# Patient Record
Sex: FEMALE | Race: WHITE | NOT HISPANIC OR LATINO | Employment: FULL TIME | URBAN - METROPOLITAN AREA
[De-identification: names, ages, dates, MRNs, and addresses within clinical notes are randomized per-mention and may not be internally consistent; named-entity substitution may affect disease eponyms.]

---

## 2017-01-18 ENCOUNTER — TELEPHONE (OUTPATIENT)
Dept: BONE DENSITY | Facility: CLINIC | Age: 62
End: 2017-01-18

## 2017-01-18 ENCOUNTER — TELEPHONE (OUTPATIENT)
Dept: FAMILY MEDICINE | Facility: CLINIC | Age: 62
End: 2017-01-18

## 2017-01-18 DIAGNOSIS — Z13.820 SCREENING FOR OSTEOPOROSIS: Primary | ICD-10-CM

## 2017-01-18 NOTE — TELEPHONE ENCOUNTER
Patient requesting order thru [facility] appointment:  tried to schedule a Dexascan, however, they told me I will need an order sent from my  for that.       Could I request that thru Dr. Kinsey?  Thanks.           Kyung Jones

## 2017-02-07 ENCOUNTER — HOSPITAL ENCOUNTER (OUTPATIENT)
Dept: MAMMOGRAPHY | Facility: CLINIC | Age: 62
Discharge: HOME | End: 2017-02-07
Attending: FAMILY MEDICINE | Admitting: FAMILY MEDICINE
Payer: COMMERCIAL

## 2017-02-07 DIAGNOSIS — Z12.31 ENCOUNTER FOR SCREENING MAMMOGRAM FOR HIGH-RISK PATIENT: ICD-10-CM

## 2017-02-07 PROCEDURE — G0202 SCR MAMMO BI INCL CAD: HCPCS

## 2017-02-20 ENCOUNTER — RADIANT APPOINTMENT (OUTPATIENT)
Dept: BONE DENSITY | Facility: CLINIC | Age: 62
End: 2017-02-20
Attending: FAMILY MEDICINE
Payer: COMMERCIAL

## 2017-02-20 ENCOUNTER — OFFICE VISIT (OUTPATIENT)
Dept: OBGYN | Facility: CLINIC | Age: 62
End: 2017-02-20
Payer: COMMERCIAL

## 2017-02-20 VITALS
SYSTOLIC BLOOD PRESSURE: 130 MMHG | WEIGHT: 177.4 LBS | HEIGHT: 68 IN | DIASTOLIC BLOOD PRESSURE: 88 MMHG | BODY MASS INDEX: 26.89 KG/M2 | TEMPERATURE: 98.3 F

## 2017-02-20 DIAGNOSIS — Z01.411 ENCOUNTER FOR GYNECOLOGICAL EXAMINATION WITH ABNORMAL FINDING: ICD-10-CM

## 2017-02-20 DIAGNOSIS — Z13.820 SCREENING FOR OSTEOPOROSIS: ICD-10-CM

## 2017-02-20 DIAGNOSIS — C54.1 ENDOMETRIAL CANCER (H): Primary | ICD-10-CM

## 2017-02-20 DIAGNOSIS — B00.9 HSV (HERPES SIMPLEX VIRUS) INFECTION: ICD-10-CM

## 2017-02-20 DIAGNOSIS — R30.0 DYSURIA: ICD-10-CM

## 2017-02-20 LAB

## 2017-02-20 PROCEDURE — 87624 HPV HI-RISK TYP POOLED RSLT: CPT | Performed by: OBSTETRICS & GYNECOLOGY

## 2017-02-20 PROCEDURE — 99396 PREV VISIT EST AGE 40-64: CPT | Performed by: OBSTETRICS & GYNECOLOGY

## 2017-02-20 PROCEDURE — 77080 DXA BONE DENSITY AXIAL: CPT | Performed by: INTERNAL MEDICINE

## 2017-02-20 PROCEDURE — 81001 URINALYSIS AUTO W/SCOPE: CPT | Performed by: OBSTETRICS & GYNECOLOGY

## 2017-02-20 PROCEDURE — 88175 CYTOPATH C/V AUTO FLUID REDO: CPT | Performed by: OBSTETRICS & GYNECOLOGY

## 2017-02-20 RX ORDER — VALACYCLOVIR HYDROCHLORIDE 500 MG/1
TABLET, FILM COATED ORAL
Qty: 40 TABLET | Refills: 1 | Status: SHIPPED | OUTPATIENT
Start: 2017-02-20 | End: 2019-05-23

## 2017-02-20 NOTE — PROGRESS NOTES
HPI:  Mary Jane Matta is a 61 year old female , postmenopausal who presents for an annual exam and pap.  She is doing well.  Self breast exam,  ACS screening mammogram recs, the use of 81 mg ASA to decrease the risk of heart disease, lipid screening, colon cancer screening recs and Dexa scan recs thoroughly reveiwed.    Patient relates that she was fit with pessary in 2016, but had to cease usage due to discomfort. She relates her urinary symptoms are intermittent. Over the last 3 months, she reports that she feels her bladder has been hanging again. She experiences a burning sensation and wakes during the night due to back pain. She reports her bladder control to be okay. Patient denies pain with sexual intercourse.     Past Medical History   Diagnosis Date     Endometrial cancer (H)      Urinary incontinence      Past Surgical History   Procedure Laterality Date     Tonsillectomy       Surgical history of -        3       Hc tooth extraction w/forcep       C total abdom hysterectomy       Dr Len BARDALES BSO staging for Stage 1 B endometrial Ca     Cholecystectomy  2015     Family History   Problem Relation Age of Onset     Circulatory Father       at an early age of annurysm-brain- at 45     Alcohol/Drug Father      HEART DISEASE Maternal Grandmother      UNKNWON DETAILS     DIABETES Paternal Grandmother      Hypertension Paternal Grandmother      CEREBROVASCULAR DISEASE Paternal Grandmother      Family History Negative Mother      alive 81 yo old     OSTEOPOROSIS Mother      Family History Negative Brother      alive 58 , smoker     Alcohol/Drug Brother      one bother only      HEART DISEASE Brother      Family History Negative Maternal Grandfather       in accident     Family History Negative Paternal Grandfather      Breast Cancer No family hx of      Cancer - colorectal No family hx of      Social History     Social History     Marital status:      Spouse name:  N/A     Number of children: N/A     Years of education: N/A     Occupational History      w [employer] w [employer]     Social History Main Topics     Smoking status: Never Smoker     Smokeless tobacco: Never Used     Alcohol use Yes      Comment: socially      Drug use: No     Sexual activity: Yes     Partners: Male     Birth control/ protection: Surgical      Comment:  with vasectomy/hysterectomy     Other Topics Concern      Service No     Blood Transfusions No     Caffeine Concern Yes     2 cups per day     Occupational Exposure Yes     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 No     Social History Narrative     for 30 years, work fulltime , working as -[employer] , 31 ,27 adn 23 yo-all out of home, no pets       Allergies:  Review of patient's allergies indicates no known allergies.    Current Outpatient Prescriptions   Medication Sig Dispense Refill     azithromycin (ZITHROMAX) 250 MG tablet Two tablets first day, then one tablet daily for four days. 6 tablet 0     valACYclovir (VALTREX) 500 MG tablet Take  by mouth. 2 gm twice a day with onset of cold sore symptoms, for one day -atleast 12 hours apart 40 tablet 0     omeprazole (PRILOSEC) 40 MG capsule Take 1 capsule by mouth daily. Take 30-60 minutes before a meal. 30 capsule 3     Multiple Vitamin (DAILY MULTIVITAMIN PO) Take  by mouth daily.         ROS:  C: NEGATIVE for fever, chills, change in weight  I: NEGATIVE for worrisome rashes, moles or lesions. Derm mole check recommended  E: NEGATIVE for vision changes or irritation  E/M: NEGATIVE for ear, mouth and throat problems  R:  NEGATIVE for significant cough or SOB  B: NEGATIVE for masses, tenderness or discharge  CV: NEGATIVE for chest pain, palpitations or peripheral edema  GI: NEGATIVE for nausea, abdominal pain, heartburn, or change in bowel habits   female: Postmenopausal, burning vaginal sensation   : NEGATIVE for frequency, dysuria, or hematuria  M: NEGATIVE for significant arthralgias or myalgia  N: NEGATIVE for weakness, dizziness or paresthesias  E: NEGATIVE for temperature intolerance, skin/hair changes  H: NEGATIVE for bleeding problems  P: NEGATIVE for changes in mood or affect    This document serves as a record of the services and decisions personally performed and made by Keagan Bautista M.D. It was created on his behalf by Ethel Gaston, a trained medical scribe. The creation of this document is based on the provider's statements to the medical scribe.  Ethel Gaston 2:48 PM February 20, 2017    EXAM:  Vitals: LMP 08/07/2008  BMI= There is no height or weight on file to calculate BMI.  Constitutional: healthy, alert and no distress  Head: Normocephalic. No masses, lesions, tenderness or abnormalities  Neck: Neck supple. No adenopathy. Thyroid symmetric, normal size,, Carotids without bruits.  ENT: NEGATIVE for ear, mouth and throat problems  Breast:  breasts symmetric, no dominant or suspicious mass, no skin or nipple changes, no axillary adenopathy, unchanged from previous exam or self exam in taught and encouraged  Cardiovascular: negative, PMI normal. No lifts, heaves, or thrills. RRR. No murmurs, clicks gallops or rub  Respiratory: negative, Percussion normal. Good diaphragmatic excursion. Lungs clear  Gastrointestinal: Abdomen soft, non-tender. BS normal. No masses, organomegaly  Genitourinary: Pelvic Exam:  External Genitalia:     Normal appearance for age, no discharge present, no tenderness present, no inflammatory lesions present, color normal  Vagina:     Normal vaginal vault without central or  paravaginal defects, no discharge present, no inflammatory lesions present, no masses present  Bladder:     Nontender to palpation  Urethra:   Urethral Body:  Urethra palpation normal, urethra structural support normal   Urethral Meatus:  No erythema or lesions present  Cervix:     Absent  Uterus:     Absent  Adnexa:     No adnexal tenderness present, no adnexal masses present  Perineum:     Perineum within normal limits, no evidence of trauma, no rashes or skin lesions present  Anus:     Anus within normal limits, no hemorrhoids present  Inguinal Lymph Nodes:     No lymphadenopathy present  Pubic Hair:     Normal pubic hair distribution for age  Genitalia and Groin:     No rashes present, no lesions present, no areas of discoloration, no masses present  Musculoskeletalextremities normal- no gross deformities noted, gait normal and normal muscle tone  Integument: no suspicious lesions or rashes  Neurologic: Gait normal. Reflexes normal and symmetric. Sensation grossly WNL., negative findings: cranial nerves 2-12 intact, muscle strength normal, reflexes normal and symmetric  Psychiatric: mentation appears normal and affect normal/bright  Hematologic/Lymphatic/Immunologic: Normal cervical lymph nodes     ASSESSMENT:/PLAN:  (Z01.419) Encounter for gynecological examination without abnormal finding  (primary encounter diagnosis)  Comment: cystocele noted  Cares and rx options discussed   Plan: *UA reflex to Microscopic and Culture         ([facility] and [facility] (except         [place] and [place]), Pap imaged thin layer        diagnostic with HPV (select HPV order below),         HPV High Risk Types DNA Cervical        done    (C54.1) Endometrial cancer (H)  Comment: pap done as f/u of endometrial Ca Rx  No evidence of reoccurence  Plan: Pap imaged thin layer diagnostic with HPV         (select HPV order below), HPV High Risk Types         DNA Cervical        doen    (R30.0) Dysuria  Comment: we will  check UA  Plan: *UA reflex to Microscopic and Culture         ([facility] and [facility] (except         [place] and [place])        ordered    (B00.9) HSV (herpes simplex virus) infection  Comment: Patient requested a refill  Plan: valACYclovir (VALTREX) 500 MG tablet        Request placed    The information in this document, created by a scribe for me, accurately reflects the services I personally performed and the decisions made by me. I have reviewed and approved this document for accuracy.     Keagan Bautista M.D.

## 2017-02-20 NOTE — MR AVS SNAPSHOT
After Visit Summary   2/20/2017    Mary Jane Matta    MRN: [id number]           Patient Information     Date Of Birth          1955        Visit Information        Provider Department      2/20/2017 1:45 PM Keagan Bautista MD [facility]        Today's Diagnoses     Endometrial cancer (H)    -  1    Dysuria        HSV (herpes simplex virus) infection        Encounter for gynecological examination with abnormal finding          Care Instructions    You can reach your [facility] Care Team any time of the day by calling [phone]. This number will put you in touch with the 24 hour nurse line if the clinic is closed.    To contact your OB/GYN Station Coordinator/Surgery Scheduler please call [phone]. This is a direct number for your care team between 8 a.m. and 4 p.m. Monday through Friday.    [facility] is open for your convenience:  Monday through Friday 8 a.m. to 6 p.m.  Closed weekends and all major holidays.          Follow-ups after your visit        Who to contact     If you have questions or need follow up information about today's clinic visit or your schedule please contact [facility] directly at [phone].  Normal or non-critical lab and imaging results will be communicated to you by MyChart, letter or phone within 4 business days after the clinic has received the results. If you do not hear from us within 7 days, please contact the clinic through [employer] or phone. If you have a critical or abnormal lab result, we will notify you by phone as soon as possible.  Submit refill requests through [employer] or call your pharmacy and they will forward the refill request to us. Please allow 3 business days for your refill to be completed.          Additional Information About Your Visit        MyChart Information     [employer] gives you secure access to your electronic health record. If you see a primary care provider, you can also send messages to your  "care team and make appointments. If you have questions, please call your primary care clinic.  If you do not have a primary care provider, please call [phone] and they will assist you.        Care EveryWhere ID     This is your Care EveryWhere ID. This could be used by other organizations to access your [facility] medical records  [id number]        Your Vitals Were     Temperature Height Last Period BMI (Body Mass Index)          98.3  F (36.8  C) (Oral) 5' 8\" (1.727 m) 08/07/2008 26.97 kg/m2         Blood Pressure from Last 3 Encounters:   02/20/17 130/88   12/07/16 126/84   02/10/16 110/78    Weight from Last 3 Encounters:   02/20/17 177 lb 6.4 oz (80.5 kg)   12/07/16 181 lb 6.4 oz (82.3 kg)   02/10/16 176 lb 4.8 oz (80 kg)              We Performed the Following     *UA reflex to Microscopic and Culture ([facility] and [facility] (except [place] and [place])     HPV High Risk Types DNA Cervical     Pap imaged thin layer diagnostic with HPV (select HPV order below)     Urine Microscopic          Where to get your medicines      These medications were sent to [facility] [address] [address] AT [facility] [address] [address]    Hours:  24-hours Phone:  [phone]     valACYclovir 500 MG tablet          Primary Care Provider Office Phone # Fax #    Laura Ford [phone]       [facility] [address]  [facility] [address]        Thank you!     Thank you for choosing [facility]  for your care. Our goal is always to provide you with excellent care. Hearing back from our patients is one way we can continue to improve our services. Please take a few minutes to complete the written survey that you may receive in the mail after your visit with us. Thank you!             Your Updated Medication List - Protect others around you: Learn how to safely use, store and " throw away your medicines at www.disposemymeds.org.          This list is accurate as of: 2/20/17 11:59 PM.  Always use your most recent med list.                   Brand Name Dispense Instructions for use    DAILY MULTIVITAMIN PO      Take by mouth daily Reported on 2/20/2017       omeprazole 40 MG capsule    priLOSEC    30 capsule    Take 1 capsule by mouth daily. Take 30-60 minutes before a meal.       valACYclovir 500 MG tablet    VALTREX    40 tablet    Take  by mouth. 2 gm twice a day with onset of cold sore symptoms, for one day -atleast 12 hours apart

## 2017-02-20 NOTE — PATIENT INSTRUCTIONS
You can reach your [facility] Care Team any time of the day by calling [phone]. This number will put you in touch with the 24 hour nurse line if the clinic is closed.    To contact your OB/GYN Station Coordinator/Surgery Scheduler please call [phone]. This is a direct number for your care team between 8 a.m. and 4 p.m. Monday through Friday.    [facility] is open for your convenience:  Monday through Friday 8 a.m. to 6 p.m.  Closed weekends and all major holidays.

## 2017-02-20 NOTE — NURSING NOTE
"Chief Complaint   Patient presents with     Gyn Exam     pap due--had mammo 2/7/17--sx of UTI--prolapse       Initial /88  Temp 98.3  F (36.8  C) (Oral)  Ht 5' 8\" (1.727 m)  Wt 177 lb 6.4 oz (80.5 kg)  LMP 08/07/2008  BMI 26.97 kg/m2 Estimated body mass index is 26.97 kg/(m^2) as calculated from the following:    Height as of this encounter: 5' 8\" (1.727 m).    Weight as of this encounter: 177 lb 6.4 oz (80.5 kg).  Medication Reconciliation: complete   Trixie Samuel CMA      "

## 2017-02-22 LAB
COPATH REPORT: NORMAL
PAP: NORMAL

## 2017-02-24 LAB
FINAL DIAGNOSIS: NORMAL
HPV HR 12 DNA CVX QL NAA+PROBE: NEGATIVE
HPV16 DNA SPEC QL NAA+PROBE: NEGATIVE
HPV18 DNA SPEC QL NAA+PROBE: NEGATIVE
SPECIMEN DESCRIPTION: NORMAL

## 2017-05-24 ENCOUNTER — RADIANT APPOINTMENT (OUTPATIENT)
Dept: GENERAL RADIOLOGY | Facility: CLINIC | Age: 62
End: 2017-05-24
Attending: PODIATRIST
Payer: COMMERCIAL

## 2017-05-24 ENCOUNTER — OFFICE VISIT (OUTPATIENT)
Dept: PODIATRY | Facility: CLINIC | Age: 62
End: 2017-05-24
Payer: COMMERCIAL

## 2017-05-24 VITALS — BODY MASS INDEX: 26.83 KG/M2 | HEART RATE: 88 BPM | WEIGHT: 177 LBS | HEIGHT: 68 IN | RESPIRATION RATE: 16 BRPM

## 2017-05-24 DIAGNOSIS — L60.0 INGROWING NAIL: ICD-10-CM

## 2017-05-24 DIAGNOSIS — M79.674 PAIN IN TOE OF RIGHT FOOT: Primary | ICD-10-CM

## 2017-05-24 DIAGNOSIS — R20.0 NUMBNESS OF TOES: ICD-10-CM

## 2017-05-24 PROCEDURE — 73630 X-RAY EXAM OF FOOT: CPT | Mod: RT

## 2017-05-24 PROCEDURE — 99203 OFFICE O/P NEW LOW 30 MIN: CPT | Performed by: PODIATRIST

## 2017-05-24 NOTE — NURSING NOTE
"Chief Complaint   Patient presents with     Foot Problems     Right 2nd toe injury x 1 year, dropped something on it, numbness, nail is posbbily ingrown       Initial Pulse 88  Resp 16  Ht 5' 8\" (1.727 m)  Wt 177 lb (80.3 kg)  LMP 08/07/2008  BMI 26.91 kg/m2 Estimated body mass index is 26.91 kg/(m^2) as calculated from the following:    Height as of this encounter: 5' 8\" (1.727 m).    Weight as of this encounter: 177 lb (80.3 kg).  Medication Reconciliation: complete  "

## 2017-05-24 NOTE — PATIENT INSTRUCTIONS
DR. SALINAS'S SCHEDULE:        MONDAY / FRIDAY AM - [place] WED[place] - [place]   [address] [place] [address] [place] [address]   P: [phone] P: [phone]   F: [phone] F:[phone]       TUESDAY - SURGERY THURSDAY - [place]   [phone] [address] [place] [address]   TO SCHEDULE AN APPT CALL: P: [phone] F: [phone]     FYI: Our schedule at [place] on Wednesday is from 7 AM - 2 PM.    SHOE RECOMMENDATIONS  Athletic Shoes Dress Shoes Sandals Inserts Stores   - United By Blue  - Asics  - Saucony (Kids)  - Paz - Dansko  - Shaw Afb  - Lulu  - Keen - Vionic  - Birkenstocks  - Teva  - Ecco  - Nash  - Halflinfer  - Crocs - SuperFeet  - Profoot  - Spenco  - Sofsole  - Tacco  - Powerstep  - WalkFit - Ángel Shoes  - The Walking Co.   - TC Running Co.   - Michael's  - DSW  - Online         TOE SPACERS        BODY MASS INDEX (BMI)  Many things can cause foot and ankle problems. Foot structure, activity level, foot mechanics and injuries are common causes of pain.    One very important issue that often goes unmentioned, is body weight.  Extra weight can cause increased stress on muscles, ligaments, bones and tendons. Sometimes just a few extra pounds is all it takes to put one over her/his threshold. Without reducing that stress, it can be difficult to alleviate pain.      Some people are uncomfortable addressing this issue, but we feel it is important for you to think about it. As Foot & Ankle specialists, our job is addressing the lower extremity problem and possible causes.     Regarding extra body weight, we encourage patients to discuss diet and weight management plans with their primary care doctors. It is this team approach that gives you the best opportunity for pain relief and getting you back on your feet.

## 2017-05-24 NOTE — LETTER
"  2017       RE: Mary Jane Matta  [address]  [place] [address]           Dear Colleague,    Thank you for referring your patient, Mary Jane Matta, to the [facility]. Please see a copy of my visit note below.      ASSESSMENT/PLAN:    Encounter Diagnoses   Name Primary?     Pain in toe of right foot Yes     Ingrowing nail      I am not sure what cause her, now resolved, dorsal right foot pain.  Differential includes:  \"lace-bite,\"  Tendonitis, stress reaction of bone, ligament strain    I suggested that she wear more supportive, rigid soled shoes mores of the time.  She showed me what she typically wears - high heel and flexible.     Her right 2nd toe pain appears to be related to the medial skin fold being compressed between nail and hallux.  We discussed options of toe spacer and partial nail avulsion.    Her numbness is likely from nerve compression under the right 2nd metatarsophalangeal joint and possibly related to shoe choice.  I am not concerned. It is localized and unilateral.     Body mass index is 26.91 kg/(m^2).    Weight management plan: Patient was referred to their PCP to discuss a diet and exercise plan.      Gordon Carranza DPM, FACFAS, MS    [facility] Department of Podiatry/Foot & Ankle Surgery      ____________________________________________________________________    HPI:         Chief Complaint: pain on top of the right foot. This is why she made the appointment.  It started during exercise.  It has resolved.      Her other issue is right 2nd toe pain and numbness.  Numbness is more on the bottom and pain distal lateral skin fold.   Onset of problem: 6+ months; she dropped a ceramic coaster on her toe.  It was painful and there was bruising.   Toe pain/ discomfort is described as:  Stinging pain, shooting  Ratin/10   Frequency:  intermittent    Certain shoes cause more pain  Previous treatment: no    *  Past Medical History:   Diagnosis Date     " Endometrial cancer (H)      Urinary incontinence    *  *  Past Surgical History:   Procedure Laterality Date     C TOTAL ABDOM HYSTERECTOMY      Dr Len BARDALES BSO staging for Stage 1 B endometrial Ca     CHOLECYSTECTOMY  2015     HC TOOTH EXTRACTION W/FORCEP       SURGICAL HISTORY OF -       3       TONSILLECTOMY     *  *  Current Outpatient Prescriptions   Medication Sig Dispense Refill     valACYclovir (VALTREX) 500 MG tablet Take  by mouth. 2 gm twice a day with onset of cold sore symptoms, for one day -atleast 12 hours apart 40 tablet 1     omeprazole (PRILOSEC) 40 MG capsule Take 1 capsule by mouth daily. Take 30-60 minutes before a meal. 30 capsule 3     Multiple Vitamin (DAILY MULTIVITAMIN PO) Take by mouth daily Reported on 2017         ROS:     A 10-point review of systems was performed and is positive for that noted in the HPI and as seen below.  All other areas are negative.     Numbness in feet?  yes   Calf pain with walking? no  Recent foot/ankle injury? Dropped ceramic coaster on  Right 2nd toe 6-9 months ago  Weight change over past 12 months? no  Self perception as overweight? yes  Recent flu-like symptoms? no  Joint pain other than feet ? no    Social History: Employment:   at [employer];  Exercise/Physical activity:  Group Fitness at [employer] 2-3x/ week;  Tobacco use:  no  Social History     Social History     Marital status:      Spouse name: N/A     Number of children: N/A     Years of education: N/A     Occupational History      w [employer] w seniors [employer]     Social History Main Topics     Smoking status: Never Smoker     Smokeless tobacco: Never Used     Alcohol use Yes      Comment: socially      Drug use: No     Sexual activity: Yes     Partners: Male     Birth control/ protection: Surgical      Comment:  with vasectomy/hysterectomy     Other Topics Concern      Service No     Blood Transfusions  "No     Caffeine Concern Yes     2 cups per day     Occupational Exposure Yes     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 No     Social History Narrative     for 30 years, work fulltime , working as -[employer] , 31 ,27 adn 25 yo-all out of home, no pets       Family history:  Family History   Problem Relation Age of Onset     Circulatory Father       at an early age of annurysm-brain- at 45     Alcohol/Drug Father      HEART DISEASE Maternal Grandmother      UNKNWON DETAILS     DIABETES Paternal Grandmother      Hypertension Paternal Grandmother      CEREBROVASCULAR DISEASE Paternal Grandmother      Family History Negative Mother      alive 81 yo old     OSTEOPOROSIS Mother      Family History Negative Maternal Grandfather       in accident     Family History Negative Paternal Grandfather      Family History Negative Brother      alive 58 , smoker     Alcohol/Drug Brother      one bother only      HEART DISEASE Brother      Breast Cancer No family hx of      Cancer - colorectal No family hx of        Rheumatoid arthritis:  no  Foot Problems: no  Diabetes: no      EXAM:    Vitals: Pulse 88  Resp 16  Ht 5' 8\" (1.727 m)  Wt 177 lb (80.3 kg)  LMP 2008  BMI 26.91 kg/m2  BMI: Body mass index is 26.91 kg/(m^2).  Height: 5' 8\"    Constitutional/ general:  Pt is in no apparent distress, appears well-nourished.  Cooperative with history and physical exam.     Vascular:  Pedal pulses are palpable bilaterally for both the DP and PT arteries.  CFT < 3 sec.  No edema.  Pedal hair growth noted.     Neuro:  Alert and oriented x 3. Coordinated gait.  Light touch sensation is intact to the " L4, L5, S1 distributions. No obvious deficits.  No evidence of neurological-based weakness, spasticity, or contracture in the lower extremities.     Derm: Normal texture and turgor.  No erythema, ecchymosis, or cyanosis.  No open lesions.  The right 2nd toenail is mildly incurvated. Tenderness on compression over the medial skin fold.  No associated erythema.     Musculoskeletal:    Lower extremity muscle strength is normal.  Patient is ambulatory without an assistive device or brace .  No gross deformities. With loading of her forefoot, her lesser toes deviate medially and the medial skin fold of the 2nd toe is pinched between the nail and hallux.     Radiographic Exam:  X-Ray Findings:  I personally reviewed the films. No fracture seen in the 2nd toe.  No evidence of metatarsal shaft fracture or periosteal reaction.      Gordon Carranza DPM, FACAQUILINO, MS    [place] Department of Podiatry/Foot & Ankle Surgery                Again, thank you for allowing me to participate in the care of your patient.        Sincerely,              Gordon Carranza DPM

## 2017-05-24 NOTE — PROGRESS NOTES
"  ASSESSMENT/PLAN:    Encounter Diagnoses   Name Primary?     Pain in toe of right foot Yes     Ingrowing nail      I am not sure what cause her, now resolved, dorsal right foot pain.  Differential includes:  \"lace-bite,\"  Tendonitis, stress reaction of bone, ligament strain    I suggested that she wear more supportive, rigid soled shoes mores of the time.  She showed me what she typically wears - high heel and flexible.     Her right 2nd toe pain appears to be related to the medial skin fold being compressed between nail and hallux.  We discussed options of toe spacer and partial nail avulsion.    Her numbness is likely from nerve compression under the right 2nd metatarsophalangeal joint and possibly related to shoe choice.  I am not concerned. It is localized and unilateral.     Body mass index is 26.91 kg/(m^2).    Weight management plan: Patient was referred to their PCP to discuss a diet and exercise plan.      Gordon Carranza DPM, FACFAS, [facility] Department of Podiatry/Foot & Ankle Surgery      ____________________________________________________________________    HPI:         Chief Complaint: pain on top of the right foot. This is why she made the appointment.  It started during exercise.  It has resolved.      Her other issue is right 2nd toe pain and numbness.  Numbness is more on the bottom and pain distal lateral skin fold.   Onset of problem: 6+ months; she dropped a ceramic coaster on her toe.  It was painful and there was bruising.   Toe pain/ discomfort is described as:  Stinging pain, shooting  Ratin/10   Frequency:  intermittent    Certain shoes cause more pain  Previous treatment: no    *  Past Medical History:   Diagnosis Date     Endometrial cancer (H)      Urinary incontinence    *  *  Past Surgical History:   Procedure Laterality Date     C TOTAL ABDOM HYSTERECTOMY      Dr Len BARDALES BSO staging for Stage 1 B endometrial Ca     CHOLECYSTECTOMY  2015     HC TOOTH EXTRACTION " W/FORCEP       SURGICAL HISTORY OF -       3       TONSILLECTOMY     *  *  Current Outpatient Prescriptions   Medication Sig Dispense Refill     valACYclovir (VALTREX) 500 MG tablet Take  by mouth. 2 gm twice a day with onset of cold sore symptoms, for one day -atleast 12 hours apart 40 tablet 1     omeprazole (PRILOSEC) 40 MG capsule Take 1 capsule by mouth daily. Take 30-60 minutes before a meal. 30 capsule 3     Multiple Vitamin (DAILY MULTIVITAMIN PO) Take by mouth daily Reported on 2017         ROS:     A 10-point review of systems was performed and is positive for that noted in the HPI and as seen below.  All other areas are negative.     Numbness in feet?  yes   Calf pain with walking? no  Recent foot/ankle injury? Dropped ceramic coaster on  Right 2nd toe 6-9 months ago  Weight change over past 12 months? no  Self perception as overweight? yes  Recent flu-like symptoms? no  Joint pain other than feet ? no    Social History: Employment:   at [employer];  Exercise/Physical activity:  Group Fitness at [employer] 2-3x/ week;  Tobacco use:  no  Social History     Social History     Marital status:      Spouse name: N/A     Number of children: N/A     Years of education: N/A     Occupational History      w [employer] w [employer]     Social History Main Topics     Smoking status: Never Smoker     Smokeless tobacco: Never Used     Alcohol use Yes      Comment: socially      Drug use: No     Sexual activity: Yes     Partners: Male     Birth control/ protection: Surgical      Comment:  with vasectomy/hysterectomy     Other Topics Concern      Service No     Blood Transfusions No     Caffeine Concern Yes     2 cups per day     Occupational Exposure Yes     medical social worker for [employer]     Hobby Snapeee No     Sleep Concern No     Stress Concern No     Weight Concern Yes     gained 25 lbs in one year     Special Diet No  "    Back Care No     Exercise Yes     runs on Sentis[employer]     Bike Helmet No     Seat Belt Yes     Self-Exams No     Parent/Sibling W/ Cabg, Mi Or Angioplasty Before 65f 55m? No     Social History Narrative     for 30 years, work fulltime , working as -[employer] , 31 ,27 adn 23 yo-all out of home, no pets       Family history:  Family History   Problem Relation Age of Onset     Circulatory Father       at an early age of annurysm-brain- at 45     Alcohol/Drug Father      HEART DISEASE Maternal Grandmother      UNKNWON DETAILS     DIABETES Paternal Grandmother      Hypertension Paternal Grandmother      CEREBROVASCULAR DISEASE Paternal Grandmother      Family History Negative Mother      alive 79 yo old     OSTEOPOROSIS Mother      Family History Negative Maternal Grandfather       in accident     Family History Negative Paternal Grandfather      Family History Negative Brother      alive 58 , smoker     Alcohol/Drug Brother      one bother only      HEART DISEASE Brother      Breast Cancer No family hx of      Cancer - colorectal No family hx of        Rheumatoid arthritis:  no  Foot Problems: no  Diabetes: no      EXAM:    Vitals: Pulse 88  Resp 16  Ht 5' 8\" (1.727 m)  Wt 177 lb (80.3 kg)  LMP 2008  BMI 26.91 kg/m2  BMI: Body mass index is 26.91 kg/(m^2).  Height: 5' 8\"    Constitutional/ general:  Pt is in no apparent distress, appears well-nourished.  Cooperative with history and physical exam.     Vascular:  Pedal pulses are palpable bilaterally for both the DP and PT arteries.  CFT < 3 sec.  No edema.  Pedal hair growth noted.     Neuro:  Alert and oriented x 3. Coordinated gait.  Light touch sensation is intact to the L4, L5, S1 distributions. No obvious deficits.  No evidence of neurological-based weakness, spasticity, or contracture in the lower extremities.     Derm: Normal texture and turgor.  No erythema, ecchymosis, or cyanosis.  No open lesions.  The right 2nd " toenail is mildly incurvated. Tenderness on compression over the medial skin fold.  No associated erythema.     Musculoskeletal:    Lower extremity muscle strength is normal.  Patient is ambulatory without an assistive device or brace .  No gross deformities. With loading of her forefoot, her lesser toes deviate medially and the medial skin fold of the 2nd toe is pinched between the nail and hallux.     Radiographic Exam:  X-Ray Findings:  I personally reviewed the films. No fracture seen in the 2nd toe.  No evidence of metatarsal shaft fracture or periosteal reaction.      Gordon Carranza DPM, FACFAS, MS    [place] Department of Podiatry/Foot & Ankle Surgery

## 2017-05-24 NOTE — MR AVS SNAPSHOT
After Visit Summary   2017    Mary Jane Matta    MRN: [id number]           Patient Information     Date Of Birth          1955        Visit Information        Provider Department      2017 9:30 AM Gordon Salinas DPM [facility]        Care Instructions    DR. SALINAS'S SCHEDULE:        MONDAY / FRIDAY AM - [place] WEDNESDAY - [place]   [address] [place] [address] [place] [address]   P: [phone] P: [phone]   F: [phone] F:[phone]       TUESDAY - SURGERY THURSDAY - [place]   [phone] [address] [place] [address]   TO SCHEDULE AN APPT CALL: P: [phone] F: [phone]     FYI: Our schedule at [place] on Wednesday is from 7 AM - 2 PM.    SHOE RECOMMENDATIONS  Athletic Shoes Dress Shoes Sandals Inserts Stores   - New Balance  - Asics  - Saucony (Kids)  - Paz - Dansko  - [place]  - Lulu  - Keen - Vionic  - Birkenstocks  - Teva  - Ecco  - Nash  - Halflinfer  - Crocs - SuperFeet  - Profoot  - Spenco  - Sofsole  - Tacco  - Powerstep  - WalkFit - Ángel Shoes  - The Walking Co.   - TC Running Co.   - Michael's  - DSW  - Online         TOE SPACERS        BODY MASS INDEX (BMI)  Many things can cause foot and ankle problems. Foot structure, activity level, foot mechanics and injuries are common causes of pain.    One very important issue that often goes unmentioned, is body weight.  Extra weight can cause increased stress on muscles, ligaments, bones and tendons. Sometimes just a few extra pounds is all it takes to put one over her/his threshold. Without reducing that stress, it can be difficult to alleviate pain.      Some people are uncomfortable addressing this issue, but we feel it is important for you to think about it. As Foot & Ankle specialists, our job is addressing the lower extremity problem and possible causes.     Regarding extra body weight, we encourage patients to  "discuss diet and weight management plans with their primary care doctors. It is this team approach that gives you the best opportunity for pain relief and getting you back on your feet.                Follow-ups after your visit        Who to contact     If you have questions or need follow up information about today's clinic visit or your schedule please contact [facility] directly at [phone].  Normal or non-critical lab and imaging results will be communicated to you by MyChart, letter or phone within 4 business days after the clinic has received the results. If you do not hear from us within 7 days, please contact the clinic through Hello Musict or phone. If you have a critical or abnormal lab result, we will notify you by phone as soon as possible.  Submit refill requests through [facility] or call your pharmacy and they will forward the refill request to us. Please allow 3 business days for your refill to be completed.          Additional Information About Your Visit        TweetflowharShopReply Information     [facility] gives you secure access to your electronic health record. If you see a primary care provider, you can also send messages to your care team and make appointments. If you have questions, please call your primary care clinic.  If you do not have a primary care provider, please call [phone] and they will assist you.        Care EveryWhere ID     This is your Care EveryWhere ID. This could be used by other organizations to access your [facility] medical records  [id number]        Your Vitals Were     Pulse Respirations Height Last Period BMI (Body Mass Index)       88 16 5' 8\" (1.727 m) 08/07/2008 26.91 kg/m2        Blood Pressure from Last 3 Encounters:   02/20/17 130/88   12/07/16 126/84   02/10/16 110/78    Weight from Last 3 Encounters:   05/24/17 177 lb (80.3 kg)   02/20/17 177 lb 6.4 oz (80.5 kg)   12/07/16 181 lb 6.4 oz (82.3 kg)              Today, you had the following     No orders found for " display       Primary Care Provider Office Phone # Fax #    Laura Obi Ford [phone]       [facility] [address]  [facility] [address]        Thank you!     Thank you for choosing [facility]  for your care. Our goal is always to provide you with excellent care. Hearing back from our patients is one way we can continue to improve our services. Please take a few minutes to complete the written survey that you may receive in the mail after your visit with us. Thank you!             Your Updated Medication List - Protect others around you: Learn how to safely use, store and throw away your medicines at www.disposemymeds.org.          This list is accurate as of: 5/24/17  9:48 AM.  Always use your most recent med list.                   Brand Name Dispense Instructions for use    DAILY MULTIVITAMIN PO      Take by mouth daily Reported on 2/20/2017       omeprazole 40 MG capsule    priLOSEC    30 capsule    Take 1 capsule by mouth daily. Take 30-60 minutes before a meal.       valACYclovir 500 MG tablet    VALTREX    40 tablet    Take  by mouth. 2 gm twice a day with onset of cold sore symptoms, for one day -atleast 12 hours apart

## 2018-04-23 ENCOUNTER — HEALTH MAINTENANCE LETTER (OUTPATIENT)
Age: 63
End: 2018-04-23

## 2018-07-18 ENCOUNTER — HOSPITAL ENCOUNTER (OUTPATIENT)
Dept: MAMMOGRAPHY | Facility: CLINIC | Age: 63
Discharge: HOME | End: 2018-07-18
Attending: FAMILY MEDICINE | Admitting: FAMILY MEDICINE
Payer: COMMERCIAL

## 2018-07-18 DIAGNOSIS — Z12.31 VISIT FOR SCREENING MAMMOGRAM: ICD-10-CM

## 2018-07-18 PROCEDURE — 77067 SCR MAMMO BI INCL CAD: CPT

## 2018-07-19 ENCOUNTER — OFFICE VISIT (OUTPATIENT)
Dept: FAMILY MEDICINE | Facility: CLINIC | Age: 63
End: 2018-07-19
Payer: COMMERCIAL

## 2018-07-19 VITALS
TEMPERATURE: 98.2 F | HEIGHT: 68 IN | DIASTOLIC BLOOD PRESSURE: 80 MMHG | BODY MASS INDEX: 27.43 KG/M2 | SYSTOLIC BLOOD PRESSURE: 128 MMHG | WEIGHT: 181 LBS | HEART RATE: 86 BPM

## 2018-07-19 DIAGNOSIS — Z23 ENCOUNTER FOR IMMUNIZATION: ICD-10-CM

## 2018-07-19 DIAGNOSIS — F43.0 ACUTE REACTION TO STRESS: Primary | ICD-10-CM

## 2018-07-19 LAB
ALBUMIN SERPL-MCNC: 3.9 G/DL (ref 3.4–5)
ALP SERPL-CCNC: 104 U/L (ref 40–150)
ALT SERPL W P-5'-P-CCNC: 27 U/L (ref 0–50)
ANION GAP SERPL CALCULATED.3IONS-SCNC: 5 MMOL/L (ref 3–14)
AST SERPL W P-5'-P-CCNC: 18 U/L (ref 0–45)
BILIRUB SERPL-MCNC: 0.5 MG/DL (ref 0.2–1.3)
BUN SERPL-MCNC: 18 MG/DL (ref 7–30)
CALCIUM SERPL-MCNC: 8.8 MG/DL (ref 8.5–10.1)
CHLORIDE SERPL-SCNC: 105 MMOL/L (ref 94–109)
CO2 SERPL-SCNC: 30 MMOL/L (ref 20–32)
CREAT SERPL-MCNC: 0.81 MG/DL (ref 0.52–1.04)
ERYTHROCYTE [DISTWIDTH] IN BLOOD BY AUTOMATED COUNT: 12.5 % (ref 10–15)
GFR SERPL CREATININE-BSD FRML MDRD: 71 ML/MIN/1.7M2
GLUCOSE SERPL-MCNC: 99 MG/DL (ref 70–99)
HCT VFR BLD AUTO: 43.7 % (ref 35–47)
HGB BLD-MCNC: 14.6 G/DL (ref 11.7–15.7)
MCH RBC QN AUTO: 31.5 PG (ref 26.5–33)
MCHC RBC AUTO-ENTMCNC: 33.4 G/DL (ref 31.5–36.5)
MCV RBC AUTO: 94 FL (ref 78–100)
PLATELET # BLD AUTO: 301 10E9/L (ref 150–450)
POTASSIUM SERPL-SCNC: 4.5 MMOL/L (ref 3.4–5.3)
PROT SERPL-MCNC: 7.7 G/DL (ref 6.8–8.8)
RBC # BLD AUTO: 4.63 10E12/L (ref 3.8–5.2)
SODIUM SERPL-SCNC: 140 MMOL/L (ref 133–144)
T4 FREE SERPL-MCNC: 0.97 NG/DL (ref 0.76–1.46)
TSH SERPL DL<=0.005 MIU/L-ACNC: 1.35 MU/L (ref 0.4–4)
WBC # BLD AUTO: 9.3 10E9/L (ref 4–11)

## 2018-07-19 PROCEDURE — 90715 TDAP VACCINE 7 YRS/> IM: CPT | Performed by: FAMILY MEDICINE

## 2018-07-19 PROCEDURE — 36415 COLL VENOUS BLD VENIPUNCTURE: CPT | Performed by: FAMILY MEDICINE

## 2018-07-19 PROCEDURE — 99214 OFFICE O/P EST MOD 30 MIN: CPT | Mod: 25 | Performed by: FAMILY MEDICINE

## 2018-07-19 PROCEDURE — 90750 HZV VACC RECOMBINANT IM: CPT | Performed by: FAMILY MEDICINE

## 2018-07-19 PROCEDURE — 84439 ASSAY OF FREE THYROXINE: CPT | Performed by: FAMILY MEDICINE

## 2018-07-19 PROCEDURE — 90471 IMMUNIZATION ADMIN: CPT | Performed by: FAMILY MEDICINE

## 2018-07-19 PROCEDURE — 90472 IMMUNIZATION ADMIN EACH ADD: CPT | Performed by: FAMILY MEDICINE

## 2018-07-19 PROCEDURE — 84443 ASSAY THYROID STIM HORMONE: CPT | Performed by: FAMILY MEDICINE

## 2018-07-19 PROCEDURE — 85027 COMPLETE CBC AUTOMATED: CPT | Performed by: FAMILY MEDICINE

## 2018-07-19 PROCEDURE — 80053 COMPREHEN METABOLIC PANEL: CPT | Performed by: FAMILY MEDICINE

## 2018-07-19 NOTE — PROGRESS NOTES
"  SUBJECTIVE:   Mary Jane Matta is a 63 year old female who presents to clinic today for the following health issues:      Having trouble with anxiety, weight gain, and hair loss.     Notes she has been caring for her mom, who broke her back, for the past 9 months. She is also working in a stressful, high demand setting.    She has stopped working out. Is not taking breaks at work, typically works through her day.     She has had success with paleo diet and WW diet, but than gains the weight back.     Not losing hair in patches, seems to be overall. New for her.     Surgical menopause 10 years ago with endometrial cancer.     Has intermittent episodes of unilateral facial numbness and tingling. Has previously been diagnosed as \"herpes\". Wonders about the shingles vaccine.       Problem list and histories reviewed & adjusted, as indicated.  Additional history: none    Patient Active Problem List   Diagnosis     Menorrhagia     Endometrial cancer (H)     CARDIOVASCULAR SCREENING; LDL GOAL LESS THAN 160     Blood in urine     Herpes labialis     Osteopenia     Advanced directives, counseling/discussion     GERD (gastroesophageal reflux disease)     Dysphagia     History of endometrial cancer     Pain in joint, lower leg     Past Surgical History:   Procedure Laterality Date     C TOTAL ABDOM HYSTERECTOMY      Dr Len BARDALES BSO staging for Stage 1 B endometrial Ca     CHOLECYSTECTOMY  2015     HC TOOTH EXTRACTION W/FORCEP       SURGICAL HISTORY OF -       3       TONSILLECTOMY         Social History   Substance Use Topics     Smoking status: Never Smoker     Smokeless tobacco: Never Used     Alcohol use Yes      Comment: socially      Family History   Problem Relation Age of Onset     Circulatory Father       at an early age of annurysm-brain- at 45     Alcohol/Drug Father      HEART DISEASE Maternal Grandmother      UNKNWON DETAILS     Diabetes Paternal Grandmother      Hypertension Paternal " "Grandmother      Cerebrovascular Disease Paternal Grandmother      Family History Negative Mother      alive 81 yo old     Osteoperosis Mother      Family History Negative Maternal Grandfather       in accident     Family History Negative Paternal Grandfather      Family History Negative Brother      alive 58 , smoker     Alcohol/Drug Brother      one bother only      HEART DISEASE Brother      Breast Cancer No family hx of      Cancer - colorectal No family hx of            Reviewed and updated as needed this visit by clinical staff       Reviewed and updated as needed this visit by Provider         ROS:  Constitutional, HEENT, cardiovascular, pulmonary, gi and gu systems are negative, except as otherwise noted.    OBJECTIVE:     /80 (BP Location: Right arm, Patient Position: Sitting, Cuff Size: Adult Regular)  Pulse 86  Temp 98.2  F (36.8  C) (Oral)  Ht 5' 8\" (1.727 m)  Wt 181 lb (82.1 kg)  LMP 2008  Breastfeeding? No  BMI 27.52 kg/m2  Body mass index is 27.52 kg/(m^2).  GENERAL: healthy, alert and no distress  EYES: Eyes grossly normal to inspection  RESP: lungs clear to auscultation - no rales, rhonchi or wheezes  CV: regular rate and rhythm, normal S1 S2, no S3 or S4, no murmur, click or rub, no peripheral edema and peripheral pulses strong  NEURO: Normal strength and tone, mentation intact and speech normal  PSYCH: mentation appears normal, affect normal/bright    Diagnostic Test Results:  none     ASSESSMENT/PLAN:     1. Acute reaction to stress - discussed her current symptoms are likely related to her stress, reviewed options for better self care. Will also check lab work today to ensure no organic cause.   - TSH  - T4, free  - CBC with platelets  - Comprehensive metabolic panel (BMP + Alb, Alk Phos, ALT, AST, Total. Bili, TP)    2. Encounter for immunization  - ADMIN 1st VACCINE  - TDAP, IM (10 - 64 YRS) - Adacel  - ZOSTER VACCINE RECOMBINANT ADJUVANTED IM NJX    25 minutes spent " with patient face-to-face, > 50% in counseling and coordination of care regarding the issues addressed in the assessment and plan.     Laura Ford MD  [facility]

## 2018-07-19 NOTE — MR AVS SNAPSHOT
After Visit Summary   7/19/2018    Mary Jane Matta    MRN: [id number]           Patient Information     Date Of Birth          1955        Visit Information        Provider Department      7/19/2018 9:40 AM Laura Ford MD [facility]        Today's Diagnoses     Acute reaction to stress    -  1    Encounter for immunization           Follow-ups after your visit        Follow-up notes from your care team     Return in about 1 year (around 7/19/2019) for Yearly Physical Exam.      Your next 10 appointments already scheduled     Jul 30, 2018 11:30 AM CDT   Office Visit with Alyson Yeboah MD   [facility] ([facility])    [address] [place] [id number] [phone]           Bring a current list of meds and any records pertaining to this visit. For Physicals, please bring immunization records and any forms needing to be filled out. Please arrive 10 minutes early to complete paperwork.              Who to contact     If you have questions or need follow up information about today's clinic visit or your schedule please contact [facility] directly at [phone].  Normal or non-critical lab and imaging results will be communicated to you by MyChart, letter or phone within 4 business days after the clinic has received the results. If you do not hear from us within 7 days, please contact the clinic through Gaos[employer]art or phone. If you have a critical or abnormal lab result, we will notify you by phone as soon as possible.  Submit refill requests through [employer] or call your pharmacy and they will forward the refill request to us. Please allow 3 business days for your refill to be completed.          Additional Information About Your Visit        MyChart Information     [employer] gives you secure access to your electronic health record. If you see a primary care provider, you can also send messages to  "your care team and make appointments. If you have questions, please call your primary care clinic.  If you do not have a primary care provider, please call [phone] and they will assist you.        Care EveryWhere ID     This is your Care EveryWhere ID. This could be used by other organizations to access your [place] medical records  [id number]        Your Vitals Were     Pulse Temperature Height Last Period Breastfeeding? BMI (Body Mass Index)    86 98.2  F (36.8  C) (Oral) 5' 8\" (1.727 m) 08/07/2008 No 27.52 kg/m2       Blood Pressure from Last 3 Encounters:   07/19/18 128/80   02/20/17 130/88   12/07/16 126/84    Weight from Last 3 Encounters:   07/19/18 181 lb (82.1 kg)   05/24/17 177 lb (80.3 kg)   02/20/17 177 lb 6.4 oz (80.5 kg)              We Performed the Following     ADMIN 1st VACCINE     CBC with platelets     Comprehensive metabolic panel (BMP + Alb, Alk Phos, ALT, AST, Total. Bili, TP)     SCREENING QUESTIONS FOR ADULT IMMUNIZATIONS     T4, free     TDAP, IM (10 - 64 YRS) - Adacel     TSH     ZOSTER VACCINE RECOMBINANT ADJUVANTED IM NJX        Primary Care Provider Office Phone # Fax #    Laura Obi Ford [phone] [address] [facility] [address]        Equal Access to Services     MARY ANNE DURBIN AH: Hadii aad ku hadasho Soomaali, waaxda luqadaha, qaybta kaalmada adeegyada, shabbir ramsay . So [facility] [phone].    ATENCIÓN: Si habla español, tiene a ayers disposición servicios gratuitos de asistencia lingüística. Llame al [phone].    We comply with applicable federal civil rights laws and [place] laws. We do not discriminate on the basis of race, color, national origin, age, disability, sex, sexual orientation, or gender identity.            Thank you!     Thank you for choosing [facility]  for your care. Our goal is always to provide you with excellent care. Hearing back from our patients is one way we can continue to " improve our services. Please take a few minutes to complete the written survey that you may receive in the mail after your visit with us. Thank you!             Your Updated Medication List - Protect others around you: Learn how to safely use, store and throw away your medicines at www.disposemymeds.org.          This list is accurate as of 7/19/18 12:26 PM.  Always use your most recent med list.                   Brand Name Dispense Instructions for use Diagnosis    DAILY MULTIVITAMIN PO      Take by mouth daily Reported on 2/20/2017        omeprazole 40 MG capsule    priLOSEC    30 capsule    Take 1 capsule by mouth daily. Take 30-60 minutes before a meal.    GERD (gastroesophageal reflux disease)       valACYclovir 500 MG tablet    VALTREX    40 tablet    Take  by mouth. 2 gm twice a day with onset of cold sore symptoms, for one day -atleast 12 hours apart    HSV (herpes simplex virus) infection, Encounter for gynecological examination with abnormal finding

## 2018-07-19 NOTE — NURSING NOTE
"  Chief Complaint   Patient presents with     Consult     see note       Initial /80 (BP Location: Right arm, Patient Position: Sitting, Cuff Size: Adult Regular)  Pulse 86  Temp 98.2  F (36.8  C) (Oral)  Ht 5' 8\" (1.727 m)  Wt 181 lb (82.1 kg)  LMP 08/07/2008  Breastfeeding? No  BMI 27.52 kg/m2 Estimated body mass index is 27.52 kg/(m^2) as calculated from the following:    Height as of this encounter: 5' 8\" (1.727 m).    Weight as of this encounter: 181 lb (82.1 kg).  Medication Reconciliation: complete      Health Maintenance addressed:  Tdap due.    Artem Ackerman CMA          "

## 2018-07-30 ENCOUNTER — OFFICE VISIT (OUTPATIENT)
Dept: OBGYN | Facility: CLINIC | Age: 63
End: 2018-07-30
Payer: COMMERCIAL

## 2018-07-30 VITALS
DIASTOLIC BLOOD PRESSURE: 98 MMHG | SYSTOLIC BLOOD PRESSURE: 130 MMHG | WEIGHT: 182.2 LBS | BODY MASS INDEX: 27.61 KG/M2 | HEIGHT: 68 IN

## 2018-07-30 DIAGNOSIS — Z01.419 ENCOUNTER FOR GYNECOLOGICAL EXAMINATION WITHOUT ABNORMAL FINDING: Primary | ICD-10-CM

## 2018-07-30 PROCEDURE — 99386 PREV VISIT NEW AGE 40-64: CPT | Performed by: OBSTETRICS & GYNECOLOGY

## 2018-07-30 ASSESSMENT — ANXIETY QUESTIONNAIRES
IF YOU CHECKED OFF ANY PROBLEMS ON THIS QUESTIONNAIRE, HOW DIFFICULT HAVE THESE PROBLEMS MADE IT FOR YOU TO DO YOUR WORK, TAKE CARE OF THINGS AT HOME, OR GET ALONG WITH OTHER PEOPLE: SOMEWHAT DIFFICULT
6. BECOMING EASILY ANNOYED OR IRRITABLE: SEVERAL DAYS
2. NOT BEING ABLE TO STOP OR CONTROL WORRYING: SEVERAL DAYS
3. WORRYING TOO MUCH ABOUT DIFFERENT THINGS: SEVERAL DAYS
5. BEING SO RESTLESS THAT IT IS HARD TO SIT STILL: NOT AT ALL
7. FEELING AFRAID AS IF SOMETHING AWFUL MIGHT HAPPEN: SEVERAL DAYS
GAD7 TOTAL SCORE: 6
1. FEELING NERVOUS, ANXIOUS, OR ON EDGE: SEVERAL DAYS

## 2018-07-30 ASSESSMENT — PATIENT HEALTH QUESTIONNAIRE - PHQ9: 5. POOR APPETITE OR OVEREATING: SEVERAL DAYS

## 2018-07-30 NOTE — PROGRESS NOTES
SUBJECTIVE:                                                   Mary Jane Matta is a 63 year old female who presents to clinic today for the following health issue(s):  Patient presents with:  Consult      HPI:  ***    Patient's last menstrual period was 2008..   Patient is sexually active, .  Using menopause for contraception.    reports that she has never smoked. She has never used smokeless tobacco.    STD testing offered?  Declined    Health maintenance updated:  yes    Today's PHQ-2 Score:   PHQ-2 (  [employer]) 2014   Q1: Little interest or pleasure in doing things 0   Q2: Feeling down, depressed or hopeless 0   PHQ-2 Score 0     Today's PHQ-9 Score:   PHQ-9 SCORE 2018   Total Score 6     Today's LOPEZ-7 Score:   LOPEZ-7 SCORE 2018   Total Score 6       Problem list and histories reviewed & adjusted, as indicated.  Additional history: as documented.    Patient Active Problem List   Diagnosis     Menorrhagia     Endometrial cancer (H)     CARDIOVASCULAR SCREENING; LDL GOAL LESS THAN 160     Blood in urine     Herpes labialis     Osteopenia     Advanced directives, counseling/discussion     GERD (gastroesophageal reflux disease)     Dysphagia     History of endometrial cancer     Pain in joint, lower leg     Past Surgical History:   Procedure Laterality Date     C TOTAL ABDOM HYSTERECTOMY      Dr Len BARDALES BSO staging for Stage 1 B endometrial Ca     CHOLECYSTECTOMY  2015     HC TOOTH EXTRACTION W/FORCEP       SURGICAL HISTORY OF -       3       TONSILLECTOMY        Social History   Substance Use Topics     Smoking status: Never Smoker     Smokeless tobacco: Never Used     Alcohol use Yes      Comment: socially       Problem (# of Occurrences) Relation (Name,Age of Onset)    Alcohol/Drug (2) Father, Brother: one bother only     Cerebrovascular Disease (1) Paternal Grandmother    Circulatory (1) Father:  at an early age of annurysm-brain- at 45    Diabetes (1)  Paternal Grandmother    Family History Negative (4) Mother: alive 81 yo old, Maternal Grandfather:  in accident, Paternal Grandfather, Brother: alive 58 , smoker    HEART DISEASE (2) Maternal Grandmother: UNKNWON DETAILS, Brother    Hypertension (1) Paternal Grandmother    Osteoperosis (1) Mother       Negative family history of: Breast Cancer, Cancer - colorectal            Current Outpatient Prescriptions   Medication Sig     Multiple Vitamin (DAILY MULTIVITAMIN PO) Take by mouth daily Reported on 2017     omeprazole (PRILOSEC) 40 MG capsule Take 1 capsule by mouth daily. Take 30-60 minutes before a meal. (Patient not taking: Reported on 2018)     valACYclovir (VALTREX) 500 MG tablet Take  by mouth. 2 gm twice a day with onset of cold sore symptoms, for one day -atleast 12 hours apart (Patient not taking: Reported on 2018)     No current facility-administered medications for this visit.      No Known Allergies    ROS:  12 point review of systems negative other than symptoms noted below.  Constitutional: Weight Gain  Psychiatric: Anxiety    OBJECTIVE:     LMP 2008  There is no height or weight on file to calculate BMI.    Exam:  {[facility] EXAM CHOICES:[id number]}     In-Clinic Test Results:  No results found for this or any previous visit (from the past 24 hour(s)).    ASSESSMENT/PLAN:                                                      No diagnosis found.    There are no Patient Instructions on file for this visit.    ***    Alyson Yeboah MD  [facility]

## 2018-07-30 NOTE — PROGRESS NOTES
Mary Jane is a 63 year old  female who presents for annual exam.     Besides routine health maintenance, she has no other health concerns today .    HPI:  The patient's PCP is Laura Ford MD.   Patient had CATIA BILATERAL SALPINGO-OOPHORECTOMY with Dr Harrington 10 yrs ago, stage IB  Has been having paps annual but I don't think those are necessary this far out  No bleeding noted  Vault is not prolapsing  Grade 2 cystocele but not bothering her      GYNECOLOGIC HISTORY:    Patient's last menstrual period was 2008.  Her current contraception method is: menopause.  She  reports that she has never smoked. She has never used smokeless tobacco.    Patient is sexually active.  STD testing offered?  Declined  Last PHQ-9 score on record =   PHQ-9 SCORE 2018   Total Score 6     Last GAD7 score on record =   LOPEZ-7 SCORE 2018   Total Score 6     Alcohol Score = 2    HEALTH MAINTENANCE:  Cholesterol:   Cholesterol   Date Value Ref Range Status   10/14/2015 181 <200 mg/dL Final     Comment:     LDL Cholesterol is the primary guide to therapy.   The NCEP recommends further evaluation of: patients with cholesterol greater   than 200 mg/dL if additional risk factors are present, cholesterol greater   than   240 mg/dL, triglycerides greater than 150 mg/dL, or HDL less than 40 mg/dL.     01/15/2011 197 0 - 200 mg/dL Final     Comment:     LDL Cholesterol is the primary guide to therapy.   The NCEP recommends further evaluation of: patients with cholesterol <200   mg/dL   if additional risk factors are present, cholesterol >240 mg/dL, triglycerides   >150 mg/dL, or HDL <40 mg/dL.   Last Mammo: 19, Result: normal, Next Mammo:   Pap: 17 NIL HPV Neg  Lab Results   Component Value Date    PAP NIL 2017    PAP NIL 10/07/2015    PAP NIL 2014   Colonoscopy:  2009, Result: normal, Next Colonoscopy:   Dexa:  17    Health maintenance updated:  yes    HISTORY:  Obstetric History        T3      L3     SAB1   TAB0   Ectopic0   Multiple0   Live Births0       # Outcome Date GA Lbr Benedicto/2nd Weight Sex Delivery Anes PTL Lv   5 SAB            4 Para            3 Term            2 Term            1 Term               Obstetric Comments   3    S/p TAHBSO for endometrial cancer       Patient Active Problem List   Diagnosis     Menorrhagia     Endometrial cancer (H)     CARDIOVASCULAR SCREENING; LDL GOAL LESS THAN 160     Blood in urine     Herpes labialis     Osteopenia     Advanced directives, counseling/discussion     GERD (gastroesophageal reflux disease)     Dysphagia     History of endometrial cancer     Pain in joint, lower leg     Past Surgical History:   Procedure Laterality Date     C TOTAL ABDOM HYSTERECTOMY      Dr Len BARDALES BSO staging for Stage 1 B endometrial Ca     CHOLECYSTECTOMY  2015     HC TOOTH EXTRACTION W/FORCEP       SURGICAL HISTORY OF -       3       TONSILLECTOMY        Social History   Substance Use Topics     Smoking status: Never Smoker     Smokeless tobacco: Never Used     Alcohol use Yes      Comment: socially       Problem (# of Occurrences) Relation (Name,Age of Onset)    Alcohol/Drug (2) Father, Brother: one bother only     Cerebrovascular Disease (1) Paternal Grandmother    Circulatory (1) Father:  at an early age of annurysm-brain- at 45    Diabetes (1) Paternal Grandmother    Family History Negative (4) Mother: alive 79 yo old, Maternal Grandfather:  in accident, Paternal Grandfather, Brother: alive 58 , smoker    HEART DISEASE (2) Maternal Grandmother: UNKNWON DETAILS, Brother    Hypertension (1) Paternal Grandmother    Osteoperosis (1) Mother       Negative family history of: Breast Cancer, Cancer - colorectal            Current Outpatient Prescriptions   Medication Sig     Multiple Vitamin (DAILY MULTIVITAMIN PO) Take by mouth daily Reported on 2017     valACYclovir (VALTREX) 500 MG tablet Take  by mouth. 2 gm twice a  "day with onset of cold sore symptoms, for one day -atleast 12 hours apart (Patient not taking: Reported on 7/19/2018)     No current facility-administered medications for this visit.      No Known Allergies    Past medical, surgical, social and family histories were reviewed and updated in EPIC.    ROS:   12 point review of systems negative other than symptoms noted below.  Constitutional: Weight Gain  Psychiatric: Anxiety    EXAM:  BP (!) 130/98  Ht 5' 8\" (1.727 m)  Wt 182 lb 3.2 oz (82.6 kg)  LMP 08/07/2008  BMI 27.7 kg/m2   BMI: Body mass index is 27.7 kg/(m^2).    PHYSICAL EXAM:  Constitutional:  Appearance: Well nourished, well developed, alert, in no acute distress  Neck:  Lymph Nodes:  No lymphadenopathy present    Thyroid:  Gland size normal, nontender, no nodules or masses present  on palpation  Chest:  Respiratory Effort:  Breathing unlabored  Cardiovascular:    Heart: Auscultation:  Regular rate, normal rhythm, no murmurs present  Breasts: Inspection of Breasts:  No lymphadenopathy present., Palpation of Breasts and Axillae:  No masses present on palpation, no breast tenderness., Axillary Lymph Nodes:  No lymphadenopathy present. and No nodularity, asymmetry or nipple discharge bilaterally.  Gastrointestinal:   Abdominal Examination:  Abdomen nontender to palpation, tone normal without rigidity or guarding, no masses present, umbilicus without lesions   Liver and Spleen:  No hepatomegaly present, liver nontender to palpation    Hernias:  No hernias present  Lymphatic: Lymph Nodes:  No other lymphadenopathy present  Skin:  General Inspection:  No rashes present, no lesions present, no areas of  discoloration    Genitalia and Groin:  No rashes present, no lesions present, no areas of  discoloration, no masses present  Neurologic/Psychiatric:    Mental Status:  Oriented X3     Pelvic Exam:  External Genitalia:     Normal appearance for age, no discharge present, no tenderness present, no inflammatory " lesions present, color normal  Vagina:     Grade 2 cystocele, no discharge present, no inflammatory lesions present, no masses present  Bladder:     Nontender to palpation  Urethra:   Urethral Body:  Urethra palpation normal, urethra structural support normal   Urethral Meatus:  No erythema or lesions present  Prior CATIA BILATERAL SALPINGO-OOPHORECTOMY  Cuff has some dense scarring at apex , doesn't prolapse with valsalva  Perineum:     Perineum within normal limits, no evidence of trauma, no rashes or skin lesions present  Anus:     Anus within normal limits, no hemorrhoids present  Inguinal Lymph Nodes:     No lymphadenopathy present  Pubic Hair:     Normal pubic hair distribution for age  Genitalia and Groin:     No rashes present, no lesions present, no areas of discoloration, no masses present      COUNSELING:   Reviewed preventive health counseling, as reflected in patient instructions       hx of uterine ca    BMI: Body mass index is 27.7 kg/(m^2).  Weight management plan: Patient was referred to their PCP to discuss a diet and exercise plan.    ASSESSMENT:  63 year old female with satisfactory annual exam.    ICD-10-CM    1. Encounter for gynecological examination without abnormal finding Z01.419        PLAN:  I think we can stop pap smears  Oncology has told us they don't recommend annual paps after hysterectomy for uterine cancer  Still needs annual mammograms  Discussed pelvic support    Alyson Yeboah MD

## 2018-07-31 ASSESSMENT — ANXIETY QUESTIONNAIRES: GAD7 TOTAL SCORE: 6

## 2018-07-31 ASSESSMENT — PATIENT HEALTH QUESTIONNAIRE - PHQ9: SUM OF ALL RESPONSES TO PHQ QUESTIONS 1-9: 6

## 2018-12-05 ENCOUNTER — E-VISIT (OUTPATIENT)
Dept: FAMILY MEDICINE | Facility: CLINIC | Age: 63
End: 2018-12-05
Payer: COMMERCIAL

## 2018-12-05 DIAGNOSIS — J06.9 VIRAL URI: Primary | ICD-10-CM

## 2018-12-05 PROCEDURE — 99444 ZZC PHYSICIAN ONLINE EVALUATION & MANAGEMENT SERVICE: CPT | Performed by: FAMILY MEDICINE

## 2019-02-01 ENCOUNTER — OFFICE VISIT (OUTPATIENT)
Dept: FAMILY MEDICINE | Facility: CLINIC | Age: 64
End: 2019-02-01
Payer: COMMERCIAL

## 2019-02-01 VITALS
HEIGHT: 68 IN | TEMPERATURE: 97.9 F | DIASTOLIC BLOOD PRESSURE: 82 MMHG | HEART RATE: 93 BPM | WEIGHT: 180.31 LBS | SYSTOLIC BLOOD PRESSURE: 108 MMHG | BODY MASS INDEX: 27.33 KG/M2 | OXYGEN SATURATION: 99 %

## 2019-02-01 DIAGNOSIS — R39.89 URINARY PROBLEM: Primary | ICD-10-CM

## 2019-02-01 DIAGNOSIS — Z13.220 SCREENING FOR LIPID DISORDERS: ICD-10-CM

## 2019-02-01 DIAGNOSIS — Z13.1 SCREENING FOR DIABETES MELLITUS: ICD-10-CM

## 2019-02-01 DIAGNOSIS — N89.8 VAGINAL IRRITATION: ICD-10-CM

## 2019-02-01 DIAGNOSIS — Z13.21 ENCOUNTER FOR VITAMIN DEFICIENCY SCREENING: ICD-10-CM

## 2019-02-01 LAB
ALBUMIN UR-MCNC: NEGATIVE MG/DL
APPEARANCE UR: CLEAR
BILIRUB UR QL STRIP: NEGATIVE
CHOLEST SERPL-MCNC: 165 MG/DL
COLOR UR AUTO: YELLOW
DEPRECATED CALCIDIOL+CALCIFEROL SERPL-MC: 17 UG/L (ref 20–75)
GLUCOSE SERPL-MCNC: 92 MG/DL (ref 70–99)
GLUCOSE UR STRIP-MCNC: NEGATIVE MG/DL
HDLC SERPL-MCNC: 55 MG/DL
HGB UR QL STRIP: NEGATIVE
KETONES UR STRIP-MCNC: NEGATIVE MG/DL
LDLC SERPL CALC-MCNC: 97 MG/DL
LEUKOCYTE ESTERASE UR QL STRIP: NEGATIVE
NITRATE UR QL: NEGATIVE
NONHDLC SERPL-MCNC: 110 MG/DL
PH UR STRIP: 5.5 PH (ref 5–7)
SOURCE: NORMAL
SP GR UR STRIP: >1.03 (ref 1–1.03)
SPECIMEN SOURCE: NORMAL
TRIGL SERPL-MCNC: 65 MG/DL
UROBILINOGEN UR STRIP-ACNC: 0.2 EU/DL (ref 0.2–1)
WET PREP SPEC: NORMAL

## 2019-02-01 PROCEDURE — 99213 OFFICE O/P EST LOW 20 MIN: CPT | Performed by: NURSE PRACTITIONER

## 2019-02-01 PROCEDURE — 87210 SMEAR WET MOUNT SALINE/INK: CPT | Performed by: NURSE PRACTITIONER

## 2019-02-01 PROCEDURE — 82306 VITAMIN D 25 HYDROXY: CPT | Performed by: NURSE PRACTITIONER

## 2019-02-01 PROCEDURE — 82947 ASSAY GLUCOSE BLOOD QUANT: CPT | Performed by: NURSE PRACTITIONER

## 2019-02-01 PROCEDURE — 36415 COLL VENOUS BLD VENIPUNCTURE: CPT | Performed by: NURSE PRACTITIONER

## 2019-02-01 PROCEDURE — 81003 URINALYSIS AUTO W/O SCOPE: CPT | Performed by: NURSE PRACTITIONER

## 2019-02-01 PROCEDURE — 80061 LIPID PANEL: CPT | Performed by: NURSE PRACTITIONER

## 2019-02-01 ASSESSMENT — MIFFLIN-ST. JEOR: SCORE: 1426.39

## 2019-02-01 NOTE — PATIENT INSTRUCTIONS
Mary Jane was seen today for labs only.    Diagnoses and all orders for this visit:    Urinary problem  -     *UA reflex to Microscopic and Culture ([place] [facility] [place] [facility] (except [place] and [place])  Results for orders placed or performed in visit on 02/01/19   *UA reflex to Microscopic and Culture ([place] [facility] [place] [facility] (except [place] and [place])   Result Value Ref Range    Color Urine Yellow     Appearance Urine Clear     Glucose Urine Negative NEG^Negative mg/dL    Bilirubin Urine Negative NEG^Negative    Ketones Urine Negative NEG^Negative mg/dL    Specific Gravity Urine >1.030 1.003 - 1.035    Blood Urine Negative NEG^Negative    pH Urine 5.5 5.0 - 7.0 pH    Protein Albumin Urine Negative NEG^Negative mg/dL    Urobilinogen Urine 0.2 0.2 - 1.0 EU/dL    Nitrite Urine Negative NEG^Negative    Leukocyte Esterase Urine Negative NEG^Negative    Source Midstream Urine        Screening for lipid disorders  -     Lipid panel reflex to direct LDL Fasting    Screening for diabetes mellitus  -     Glucose    Encounter for vitamin deficiency screening  -     Vitamin D Deficiency    Vaginal irritation - evaluate for vaginal yeast or bacterial vaginosis  -     Wet prep          Colonoscopy due in May 2019.

## 2019-02-01 NOTE — PROGRESS NOTES
SUBJECTIVE:   Mary Jane Matta is a 63 year old female who presents to clinic today for the following health issues:      Vitamin D level, HDL, LDL and Triglycerides as well as a fasting glucose- labs needed for dietician.  No known history of Vitamin D deficiency.      URINARY TRACT SYMPTOMS  Onset: a couple months off and on, last 2-3 a little bit more, foul odor    Description:   Painful urination (Dysuria): YES  Blood in urine (Hematuria): no   Delay in urine (Hesitency): no     Intensity: mild    Progression of Symptoms:  intermittent    Accompanying Signs & Symptoms:  Fever/chills: no   Flank pain YES  Nausea and vomiting: no   Any vaginal symptoms: not sure if she has some burning in vagina, intermittent at night.    Abdominal/Pelvic Pain: no     History:   History of frequent UTI's: YES  History of kidney stones: no   Sexually Active: YES  Possibility of pregnancy: No    Precipitating factors:   nothing    Therapies Tried and outcome: Increase fluid intake- drinking more water    Stomach flu a few weeks ago.        Problem list and histories reviewed & adjusted, as indicated.  Additional history: as documented    Patient Active Problem List   Diagnosis     Menorrhagia     Endometrial cancer (H)     CARDIOVASCULAR SCREENING; LDL GOAL LESS THAN 160     Blood in urine     Herpes labialis     Osteopenia     Advanced directives, counseling/discussion     GERD (gastroesophageal reflux disease)     Dysphagia     History of endometrial cancer     Pain in joint, lower leg     Past Surgical History:   Procedure Laterality Date     C TOTAL ABDOM HYSTERECTOMY      Dr Len BARDALES BSO staging for Stage 1 B endometrial Ca     CHOLECYSTECTOMY  2015     HC TOOTH EXTRACTION W/FORCEP       SURGICAL HISTORY OF -       3       TONSILLECTOMY         Social History     Tobacco Use     Smoking status: Never Smoker     Smokeless tobacco: Never Used   Substance Use Topics     Alcohol use: Yes     Comment: socially       "Family History   Problem Relation Age of Onset     Circulatory Father          at an early age of annurysm-brain- at 45     Alcohol/Drug Father      Heart Disease Maternal Grandmother         UNKNWON DETAILS     Diabetes Paternal Grandmother      Hypertension Paternal Grandmother      Cerebrovascular Disease Paternal Grandmother      Family History Negative Mother         alive 79 yo old     Osteoporosis Mother      Family History Negative Maternal Grandfather          in accident     Family History Negative Paternal Grandfather      Family History Negative Brother         alive 58 , smoker     Alcohol/Drug Brother         one bother only      Heart Disease Brother      Breast Cancer No family hx of      Cancer - colorectal No family hx of          Current Outpatient Medications   Medication Sig Dispense Refill     Multiple Vitamin (DAILY MULTIVITAMIN PO) Take by mouth daily Reported on 2017       valACYclovir (VALTREX) 500 MG tablet Take  by mouth. 2 gm twice a day with onset of cold sore symptoms, for one day -atleast 12 hours apart (Patient not taking: Reported on 2018) 40 tablet 1     No Known Allergies    Reviewed and updated as needed this visit by clinical staff       Reviewed and updated as needed this visit by Provider         ROS:  Constitutional, HEENT, cardiovascular, pulmonary, gi and gu systems are negative, except as otherwise noted.    OBJECTIVE:     /82 (BP Location: Right arm, Patient Position: Sitting, Cuff Size: Adult Regular)   Pulse 93   Temp 97.9  F (36.6  C) (Oral)   Ht 1.727 m (5' 8\")   Wt 81.8 kg (180 lb 5 oz)   LMP 2008   SpO2 99%   BMI 27.42 kg/m    Body mass index is 27.42 kg/m .    GENERAL: healthy, alert and no distress  RESP: lungs clear to auscultation - no rales, rhonchi or wheezes  CV: regular rate and rhythm, normal S1 S2, no S3 or S4, no murmur, click or rub  ABDOMEN: soft, nontender, no hepatosplenomegaly, no masses and bowel sounds " normal  PSYCH: mentation appears normal, affect normal/bright    Diagnostic Test Results:  Results for orders placed or performed in visit on 02/01/19   *UA reflex to Microscopic and Culture ([place] and [facility] (except [place] and [place])   Result Value Ref Range    Color Urine Yellow     Appearance Urine Clear     Glucose Urine Negative NEG^Negative mg/dL    Bilirubin Urine Negative NEG^Negative    Ketones Urine Negative NEG^Negative mg/dL    Specific Gravity Urine >1.030 1.003 - 1.035    Blood Urine Negative NEG^Negative    pH Urine 5.5 5.0 - 7.0 pH    Protein Albumin Urine Negative NEG^Negative mg/dL    Urobilinogen Urine 0.2 0.2 - 1.0 EU/dL    Nitrite Urine Negative NEG^Negative    Leukocyte Esterase Urine Negative NEG^Negative    Source Midstream Urine    Wet prep   Result Value Ref Range    Specimen Description Vagina     Wet Prep No Trichomonas seen     Wet Prep No clue cells seen     Wet Prep No yeast seen        ASSESSMENT/PLAN:     Mary Jane was seen today for labs only.    Diagnoses and all orders for this visit:    Urinary problem  -     *UA reflex to Microscopic and Culture ([place] and [facility] (except [place] and [place]):  Not suggestive of infection.      Screening for lipid disorders  -     Lipid panel reflex to direct LDL Fasting    Screening for diabetes mellitus  -     Glucose    Encounter for vitamin deficiency screening  -     Vitamin D Deficiency    Vaginal irritation  -     Wet prep:  Not suggestive of yeast or bacterial vaginosis  -     Encouraged trial of use of Replens vaginal moisture.     Follow-up with no improvement or worsening of symptoms.            SHERRELL Parsons [facility] [place]

## 2019-02-01 NOTE — RESULT ENCOUNTER NOTE
Dear Mary Jane,     Your wet prep was normal and reassuring, there were no signs of a vaginal yeast infection or bacterial vaginosis.        Please send a [employer] message or call [phone]  if you have any questions.      SHERRELL Parsons, [facility]    If you have further questions about the interpretation of your labs, [email or web address] is a good website to check out for further information.

## 2019-02-01 NOTE — RESULT ENCOUNTER NOTE
Dear Mary Jane,     -Vitamin D level is low and oral supplementation should be started.  ADVISE: starting over the counter Vitamin D3  2000 IU once daily.        Please send a [employer] message or call [phone]  if you have any questions.      SHERRELL Parsons, CNP  [facility]    If you have further questions about the interpretation of your labs, [email or web address] is a good website to check out for further information.

## 2019-05-23 ENCOUNTER — OFFICE VISIT (OUTPATIENT)
Dept: PEDIATRICS | Facility: CLINIC | Age: 64
End: 2019-05-23
Payer: COMMERCIAL

## 2019-05-23 VITALS
SYSTOLIC BLOOD PRESSURE: 130 MMHG | OXYGEN SATURATION: 99 % | HEART RATE: 90 BPM | TEMPERATURE: 98.7 F | DIASTOLIC BLOOD PRESSURE: 80 MMHG | WEIGHT: 175.7 LBS | BODY MASS INDEX: 26.72 KG/M2

## 2019-05-23 DIAGNOSIS — R20.0 RIGHT LEG NUMBNESS: Primary | ICD-10-CM

## 2019-05-23 PROCEDURE — 99213 OFFICE O/P EST LOW 20 MIN: CPT | Performed by: INTERNAL MEDICINE

## 2019-05-23 RX ORDER — CYCLOBENZAPRINE HCL 10 MG
5-10 TABLET ORAL 3 TIMES DAILY PRN
Qty: 30 TABLET | Refills: 0 | Status: SHIPPED | OUTPATIENT
Start: 2019-05-23 | End: 2021-06-07

## 2019-05-23 NOTE — PATIENT INSTRUCTIONS
I think that your discomfort is due to a pinched nerve as it runs through your buttocks, and sitting likely makes this worse.     Try a muscle relaxant like Flexeril 1/2-1 tab as needed (up to 3 times daily, but start at night). This can make you groggy so be aware.    Use ibuprofen 2-3 tabs every 8 hours as needed. TAKE WITH FOOD!    Try to sit less and move around as much as possible, consider a standing desk and getting up/taking breaks on road trips.    Call if you want to see physical therapy.

## 2019-05-23 NOTE — PROGRESS NOTES
"Subjective     Mary Jane Matta is a 63 year old female who presents to clinic today for the following health issues:    HPI   Musculoskeletal problem/pain      Duration: 1 month. 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 Initially improved after first trip, then got worse and has persisted since her last trip a couple of weeks ago. She reports that she has numbness and tingling in her R groin and medial leg extending down to her calf. No pain, no weakness. No leg swelling, warmth or redness. No lower back pain or other injury that she reports. No buttock pain.     Prior to this, she had been experiencing a \"jumping\" sensation in her legs, which has improved since starting magnesium supplement.     Also has some neck tightness which she attributes to stress at work, and will get tingling in her hands related to this. No double vision. She has a hx of migraines, and has had a normal brain MRI about 5 years ago.    Patient Active Problem List   Diagnosis     Menorrhagia     Endometrial cancer (H)     CARDIOVASCULAR SCREENING; LDL GOAL LESS THAN 160     Blood in urine     Herpes labialis     Osteopenia     Advanced directives, counseling/discussion     GERD (gastroesophageal reflux disease)     Dysphagia     " History of endometrial cancer     Pain in joint, lower leg     Past Surgical History:   Procedure Laterality Date     C TOTAL ABDOM HYSTERECTOMY      Dr Len BARDALES BSO staging for Stage 1 B endometrial Ca     CHOLECYSTECTOMY  2015     HC TOOTH EXTRACTION W/FORCEP       SURGICAL HISTORY OF -       3       TONSILLECTOMY         Social History     Tobacco Use     Smoking status: Never Smoker     Smokeless tobacco: Never Used   Substance Use Topics     Alcohol use: Yes     Comment: socially      Family History   Problem Relation Age of Onset     Circulatory Father          at an early age of annurysm-brain- at 45     Alcohol/Drug Father      Heart Disease Maternal Grandmother         UNKNWON DETAILS     Diabetes Paternal Grandmother      Hypertension Paternal Grandmother      Cerebrovascular Disease Paternal Grandmother      Family History Negative Mother         alive 81 yo old     Osteoporosis Mother      Family History Negative Maternal Grandfather          in accident     Family History Negative Paternal Grandfather      Family History Negative Brother         alive 58 , smoker     Alcohol/Drug Brother         one bother only      Heart Disease Brother      Breast Cancer No family hx of      Cancer - colorectal No family hx of            Reviewed and updated as needed this visit by Provider  Meds         Review of Systems   ROS COMP: Constitutional, HEENT, MSK, neuro systems are negative, except as otherwise noted.      Objective    /80 (BP Location: Right arm, Patient Position: Sitting, Cuff Size: Adult Regular)   Pulse 90   Temp 98.7  F (37.1  C) (Tympanic)   Wt 79.7 kg (175 lb 11.2 oz)   LMP 2008   SpO2 99%   BMI 26.72 kg/m    There is no height or weight on file to calculate BMI.  Physical Exam   GENERAL: healthy, alert and no distress  MS: no gross musculoskeletal defects noted, no edema  NEURO: Normal strength and tone, sensory exam grossly normal, mentation intact,  cranial nerves 2-12 intact and DTR's normal and symmetric, sensation intact and symmetric throughout lower extremities, and strength 5/5          Assessment & Plan     1. Right leg numbness  Anticipate that this is due to nerve compression after being seated for extended period of time. Reassuring exam, and age and relatively recent brain MRI reassuring that this isn't MS. Discussed treatment options, including ibuprofen for anti-inflammatory properties, muscle relaxant, and stretching/increased movement. If not improving can try physical therapy.   - cyclobenzaprine (FLEXERIL) 10 MG tablet; Take 0.5-1 tablets (5-10 mg) by mouth 3 times daily as needed for muscle spasms  Dispense: 30 tablet; Refill: 0             Patient Instructions   I think that your discomfort is due to a pinched nerve as it runs through your buttocks, and sitting likely makes this worse.     Try a muscle relaxant like Flexeril 1/2-1 tab as needed (up to 3 times daily, but start at night). This can make you groggy so be aware.    Use ibuprofen 2-3 tabs every 8 hours as needed. TAKE WITH FOOD!    Try to sit less and move around as much as possible, consider a standing desk and getting up/taking breaks on road trips.    Call if you want to see physical therapy.       No follow-ups on file.    Radha Armas MD  [facility]

## 2019-11-08 ENCOUNTER — HEALTH MAINTENANCE LETTER (OUTPATIENT)
Age: 64
End: 2019-11-08

## 2019-12-29 ENCOUNTER — TRANSFERRED RECORDS (OUTPATIENT)
Dept: HEALTH INFORMATION MANAGEMENT | Facility: CLINIC | Age: 64
End: 2019-12-29

## 2020-05-10 ENCOUNTER — RESULTS ONLY (OUTPATIENT)
Dept: LAB | Age: 65
End: 2020-05-10

## 2020-05-10 ENCOUNTER — APPOINTMENT (OUTPATIENT)
Dept: URGENT CARE | Facility: URGENT CARE | Age: 65
End: 2020-05-10
Payer: COMMERCIAL

## 2020-05-11 LAB
SARS-COV-2 RNA SPEC QL NAA+PROBE: NOT DETECTED
SPECIMEN SOURCE: NORMAL

## 2020-12-06 ENCOUNTER — HEALTH MAINTENANCE LETTER (OUTPATIENT)
Age: 65
End: 2020-12-06

## 2021-05-20 ENCOUNTER — HOSPITAL ENCOUNTER (OUTPATIENT)
Dept: MAMMOGRAPHY | Facility: CLINIC | Age: 66
Discharge: HOME | End: 2021-05-20
Attending: FAMILY MEDICINE | Admitting: FAMILY MEDICINE
Payer: COMMERCIAL

## 2021-05-20 DIAGNOSIS — Z12.31 VISIT FOR SCREENING MAMMOGRAM: ICD-10-CM

## 2021-05-20 PROCEDURE — 77063 BREAST TOMOSYNTHESIS BI: CPT

## 2021-06-07 ENCOUNTER — OFFICE VISIT (OUTPATIENT)
Dept: FAMILY MEDICINE | Facility: CLINIC | Age: 66
End: 2021-06-07
Payer: COMMERCIAL

## 2021-06-07 VITALS
TEMPERATURE: 97.9 F | SYSTOLIC BLOOD PRESSURE: 128 MMHG | WEIGHT: 197.6 LBS | HEIGHT: 68 IN | RESPIRATION RATE: 18 BRPM | HEART RATE: 86 BPM | DIASTOLIC BLOOD PRESSURE: 80 MMHG | OXYGEN SATURATION: 99 % | BODY MASS INDEX: 29.95 KG/M2

## 2021-06-07 DIAGNOSIS — Z12.11 SPECIAL SCREENING FOR MALIGNANT NEOPLASMS, COLON: ICD-10-CM

## 2021-06-07 DIAGNOSIS — Z11.4 SCREENING FOR HIV (HUMAN IMMUNODEFICIENCY VIRUS): ICD-10-CM

## 2021-06-07 DIAGNOSIS — M85.80 OSTEOPENIA, UNSPECIFIED LOCATION: ICD-10-CM

## 2021-06-07 DIAGNOSIS — Z11.59 NEED FOR HEPATITIS C SCREENING TEST: ICD-10-CM

## 2021-06-07 DIAGNOSIS — Z00.00 ROUTINE GENERAL MEDICAL EXAMINATION AT A HEALTH CARE FACILITY: Primary | ICD-10-CM

## 2021-06-07 DIAGNOSIS — M25.50 MULTIPLE JOINT PAIN: ICD-10-CM

## 2021-06-07 DIAGNOSIS — R63.5 WEIGHT GAIN: ICD-10-CM

## 2021-06-07 DIAGNOSIS — K21.00 GASTROESOPHAGEAL REFLUX DISEASE WITH ESOPHAGITIS, UNSPECIFIED WHETHER HEMORRHAGE: ICD-10-CM

## 2021-06-07 LAB
ALBUMIN SERPL-MCNC: 3.7 G/DL (ref 3.4–5)
ALP SERPL-CCNC: 99 U/L (ref 40–150)
ALT SERPL W P-5'-P-CCNC: 24 U/L (ref 0–50)
ANION GAP SERPL CALCULATED.3IONS-SCNC: 5 MMOL/L (ref 3–14)
AST SERPL W P-5'-P-CCNC: 18 U/L (ref 0–45)
BILIRUB SERPL-MCNC: 0.4 MG/DL (ref 0.2–1.3)
BUN SERPL-MCNC: 18 MG/DL (ref 7–30)
CALCIUM SERPL-MCNC: 9.1 MG/DL (ref 8.5–10.1)
CHLORIDE SERPL-SCNC: 108 MMOL/L (ref 94–109)
CHOLEST SERPL-MCNC: 172 MG/DL
CO2 SERPL-SCNC: 27 MMOL/L (ref 20–32)
CREAT SERPL-MCNC: 0.84 MG/DL (ref 0.52–1.04)
CRP SERPL-MCNC: <2.9 MG/L (ref 0–8)
DEPRECATED CALCIDIOL+CALCIFEROL SERPL-MC: 41 UG/L (ref 20–75)
ERYTHROCYTE [DISTWIDTH] IN BLOOD BY AUTOMATED COUNT: 12.9 % (ref 10–15)
ERYTHROCYTE [SEDIMENTATION RATE] IN BLOOD BY WESTERGREN METHOD: 10 MM/H (ref 0–30)
GFR SERPL CREATININE-BSD FRML MDRD: 73 ML/MIN/{1.73_M2}
GLUCOSE SERPL-MCNC: 93 MG/DL (ref 70–99)
HCT VFR BLD AUTO: 42.7 % (ref 35–47)
HCV AB SERPL QL IA: NONREACTIVE
HDLC SERPL-MCNC: 59 MG/DL
HGB BLD-MCNC: 14.1 G/DL (ref 11.7–15.7)
HIV 1+2 AB+HIV1 P24 AG SERPL QL IA: NONREACTIVE
LDLC SERPL CALC-MCNC: 92 MG/DL
MCH RBC QN AUTO: 31.2 PG (ref 26.5–33)
MCHC RBC AUTO-ENTMCNC: 33 G/DL (ref 31.5–36.5)
MCV RBC AUTO: 95 FL (ref 78–100)
NONHDLC SERPL-MCNC: 113 MG/DL
PLATELET # BLD AUTO: 299 10E9/L (ref 150–450)
POTASSIUM SERPL-SCNC: 4.5 MMOL/L (ref 3.4–5.3)
PROT SERPL-MCNC: 7.2 G/DL (ref 6.8–8.8)
RBC # BLD AUTO: 4.52 10E12/L (ref 3.8–5.2)
SODIUM SERPL-SCNC: 140 MMOL/L (ref 133–144)
TRIGL SERPL-MCNC: 105 MG/DL
TSH SERPL DL<=0.005 MIU/L-ACNC: 2.31 MU/L (ref 0.4–4)
WBC # BLD AUTO: 8.9 10E9/L (ref 4–11)

## 2021-06-07 PROCEDURE — 99213 OFFICE O/P EST LOW 20 MIN: CPT | Mod: 25 | Performed by: FAMILY MEDICINE

## 2021-06-07 PROCEDURE — 85652 RBC SED RATE AUTOMATED: CPT | Performed by: FAMILY MEDICINE

## 2021-06-07 PROCEDURE — 87389 HIV-1 AG W/HIV-1&-2 AB AG IA: CPT | Performed by: FAMILY MEDICINE

## 2021-06-07 PROCEDURE — 90750 HZV VACC RECOMBINANT IM: CPT | Performed by: FAMILY MEDICINE

## 2021-06-07 PROCEDURE — 80061 LIPID PANEL: CPT | Performed by: FAMILY MEDICINE

## 2021-06-07 PROCEDURE — 80053 COMPREHEN METABOLIC PANEL: CPT | Performed by: FAMILY MEDICINE

## 2021-06-07 PROCEDURE — 36415 COLL VENOUS BLD VENIPUNCTURE: CPT | Performed by: FAMILY MEDICINE

## 2021-06-07 PROCEDURE — 90471 IMMUNIZATION ADMIN: CPT | Performed by: FAMILY MEDICINE

## 2021-06-07 PROCEDURE — 99396 PREV VISIT EST AGE 40-64: CPT | Mod: 25 | Performed by: FAMILY MEDICINE

## 2021-06-07 PROCEDURE — 86803 HEPATITIS C AB TEST: CPT | Performed by: FAMILY MEDICINE

## 2021-06-07 PROCEDURE — 84443 ASSAY THYROID STIM HORMONE: CPT | Performed by: FAMILY MEDICINE

## 2021-06-07 PROCEDURE — 85027 COMPLETE CBC AUTOMATED: CPT | Performed by: FAMILY MEDICINE

## 2021-06-07 PROCEDURE — 86140 C-REACTIVE PROTEIN: CPT | Performed by: FAMILY MEDICINE

## 2021-06-07 PROCEDURE — 82306 VITAMIN D 25 HYDROXY: CPT | Performed by: FAMILY MEDICINE

## 2021-06-07 RX ORDER — OMEPRAZOLE 40 MG/1
40 CAPSULE, DELAYED RELEASE ORAL DAILY
Qty: 90 CAPSULE | Refills: 0 | Status: SHIPPED | OUTPATIENT
Start: 2021-06-07 | End: 2023-03-30

## 2021-06-07 ASSESSMENT — ENCOUNTER SYMPTOMS
DIZZINESS: 1
ABDOMINAL PAIN: 1

## 2021-06-07 ASSESSMENT — MIFFLIN-ST. JEOR: SCORE: 1494.81

## 2021-06-07 NOTE — PROGRESS NOTES
SUBJECTIVE:   CC: Mary Jane Matta is an 65 year old woman who presents for preventive health visit.     Patient has been advised of split billing requirements and indicates understanding: Yes     Healthy Habits:     Getting at least 3 servings of Calcium per day:  NO    Bi-annual eye exam:  NO    Dental care twice a year:  NO    Sleep apnea or symptoms of sleep apnea:  None    Diet:  Regular (no restrictions)    Frequency of exercise:  1 day/week    Duration of exercise:  Less than 15 minutes    Taking medications regularly:  Not Applicable    Medication side effects:  Not applicable    PHQ-2 Total Score: 1    Additional concerns today:  Yes      1. Joint aches - mostly when she wakes in the AM or when she has been seated for a long time. Following with Ortho for left hip pain. Pain gets better after she's moved a bit more - knees, hands, hips, ankles. Mom with SD in her past. No other FH of rheum conditions. Not exercising regularly. Regular Western diet.     2. Instability - having occasional episodes where she feels unsteady on her feet. Not related to pain. Unsure about hydration status. Always seems to lean toward the right when she has the issue. Not every time she is moving.     3. Weight gain - was formerly having success with nutritional weight and wellness in [place]. Stopped doing this diet, started gaining weight back. Also working more, stress levels higher.     4. Acid reflux and bloating - ongoing for months. Has tried cutting out coffee, which is a trigger for her. Believes citrus is also a trigger for her, but not restricting. Occasionally takes PPI, seems to help. Bloats after meals.           Today's PHQ-2 Score:   PHQ-2 ( 1999 Pfizer) 6/7/2021   Q1: Little interest or pleasure in doing things 0   Q2: Feeling down, depressed or hopeless 1   PHQ-2 Score 1   Q1: Little interest or pleasure in doing things Not at all   Q2: Feeling down, depressed or hopeless Several days   PHQ-2 Score 1        Abuse: Current or Past (Physical, Sexual or Emotional) - No  Do you feel safe in your environment? Yes    Have you ever done Advance Care Planning? (For example, a Health Directive, POLST, or a discussion with a medical provider or your loved ones about your wishes): Yes, patient states has an Advance Care Planning document and will bring a copy to the clinic.    Social History     Tobacco Use     Smoking status: Never Smoker     Smokeless tobacco: Never Used   Substance Use Topics     Alcohol use: Yes     Comment: socially (3 drinks per week)     If you drink alcohol do you typically have >3 drinks per day or >7 drinks per week? No    Alcohol Use 2021   Prescreen: >3 drinks/day or >7 drinks/week? No   Prescreen: >3 drinks/day or >7 drinks/week? -       Reviewed orders with patient.  Reviewed health maintenance and updated orders accordingly - Yes  Patient Active Problem List   Diagnosis     Herpes labialis     Osteopenia     Advanced directives, counseling/discussion     GERD (gastroesophageal reflux disease)     History of endometrial cancer     Pain in joint, lower leg     Past Surgical History:   Procedure Laterality Date     C TOTAL ABDOM HYSTERECTOMY      Dr Len BARDALES BSO staging for Stage 1 B endometrial Ca     CHOLECYSTECTOMY  2015     HC TOOTH EXTRACTION W/FORCEP       SURGICAL HISTORY OF -       3       TONSILLECTOMY         Social History     Tobacco Use     Smoking status: Never Smoker     Smokeless tobacco: Never Used   Substance Use Topics     Alcohol use: Yes     Comment: socially (3 drinks per week)     Family History   Problem Relation Age of Onset     Circulatory Father          at an early age of annurysm-brain- at 45     Alcohol/Drug Father      Heart Disease Maternal Grandmother         UNKNWON DETAILS     Hypertension Maternal Grandmother      Cerebrovascular Disease Maternal Grandmother      Diabetes Paternal Grandmother      Hypertension Paternal Grandmother       "Cerebrovascular Disease Paternal Grandmother      Family History Negative Mother         alive 79 yo old     Osteoporosis Mother      Family History Negative Maternal Grandfather          in accident     Family History Negative Paternal Grandfather      Family History Negative Brother         alive 58 , smoker     Alcohol/Drug Brother         one bother only      Heart Disease Brother      Breast Cancer No family hx of      Cancer - colorectal No family hx of            Breast Cancer Screening:  Any new diagnosis of family breast, ovarian, or bowel cancer? No    FSH-7: No flowsheet data found.    Mammogram Screening: Recommended mammography every 1-2 years with patient discussion and risk factor consideration  Pertinent mammograms are reviewed under the imaging tab.    History of abnormal Pap smear: NO - age 30-65 PAP every 5 years with negative HPV co-testing recommended  PAP / HPV Latest Ref Rng & Units 2017 10/7/2015 2014   PAP - NIL NIL NIL   HPV 16 DNA NEG Negative Negative -   HPV 18 DNA NEG Negative Negative -   OTHER HR HPV NEG Negative Negative -     Reviewed and updated as needed this visit by clinical staff  Tobacco  Allergies  Meds   Med Hx  Surg Hx  Fam Hx  Soc Hx        Reviewed and updated as needed this visit by Provider    Meds               Review of Systems   Gastrointestinal: Positive for abdominal pain.   Neurological: Positive for dizziness.       ROS: 10 point ROS neg other than the symptoms noted above in the HPI.    OBJECTIVE:   /80 (BP Location: Right arm, Patient Position: Chair, Cuff Size: Adult Large)   Pulse 86   Temp 97.9  F (36.6  C) (Oral)   Resp 18   Ht 1.727 m (5' 8\")   Wt 89.6 kg (197 lb 9.6 oz)   LMP 2008   SpO2 99%   BMI 30.04 kg/m    Physical Exam  GENERAL APPEARANCE: healthy, alert and no distress  EYES: Eyes grossly normal to inspection, PERRL and conjunctivae and sclerae normal  HENT: ear canals and TM's normal, nose and mouth without " ulcers or lesions, oropharynx clear and oral mucous membranes moist  NECK: no adenopathy, no asymmetry, masses, or scars and thyroid normal to palpation  RESP: lungs clear to auscultation - no rales, rhonchi or wheezes  BREAST: normal without masses, tenderness or nipple discharge and no palpable axillary masses or adenopathy  CV: regular rate and rhythm, normal S1 S2, no S3 or S4, no murmur, click or rub, no peripheral edema and peripheral pulses strong  ABDOMEN: soft, nontender, no hepatosplenomegaly, no masses and bowel sounds normal  MS: no musculoskeletal defects are noted and gait is age appropriate without ataxia  SKIN: no suspicious lesions or rashes  NEURO: Normal strength and tone, sensory exam grossly normal, mentation intact and speech normal  PSYCH: mentation appears normal and affect normal/bright    Diagnostic Test Results:  Labs reviewed in Epic    ASSESSMENT/PLAN:   1. Routine general medical examination at a health care facility  - ZOSTER VACCINE RECOMBINANT ADJUVANTED IM NJX  - Lipid panel reflex to direct LDL Fasting  - CBC with platelets  - Comprehensive metabolic panel (BMP + Alb, Alk Phos, ALT, AST, Total. Bili, TP)    2. Multiple joint pain - will runs labs as below. Encouraged more whole foods and mild exercise.   - ESR: Erythrocyte sedimentation rate  - CRP, inflammation  - TSH with free T4 reflex    3. Weight gain - likely related to inactivity, change in diet. Encouraged stricter regulation    4. Gastroesophageal reflux disease with esophagitis, unspecified whether hemorrhage - discussed her symptoms are likely cause by too much acid in the stomach. Will use 3 month course of PPI to reset the system, asked that she identify her trigger foods as well and avoid in the future.   - omeprazole (PRILOSEC) 40 MG DR capsule; Take 1 capsule (40 mg) by mouth daily  Dispense: 90 capsule; Refill: 0    5. Osteopenia, unspecified location - needs surveillance imaging  - DEXA HIP/PELVIS/SPINE - Future;  "Future  - Vitamin D Deficiency    6. Need for hepatitis C screening test  - Hepatitis C Screen Reflex to HCV RNA Quant and Genotype    7. Screening for HIV (human immunodeficiency virus)  - HIV Antigen Antibody Combo    8. Special screening for malignant neoplasms, colon  - COLOGUARD(EXACT SCIENCES)    Patient has been advised of split billing requirements and indicates understanding: Yes  COUNSELING:  Reviewed preventive health counseling, as reflected in patient instructions    Estimated body mass index is 30.04 kg/m  as calculated from the following:    Height as of this encounter: 1.727 m (5' 8\").    Weight as of this encounter: 89.6 kg (197 lb 9.6 oz).    She reports that she has never smoked. She has never used smokeless tobacco.      Laura Ford MD  [facility]  "

## 2021-06-07 NOTE — NURSING NOTE
Prior to immunization administration, verified patients identity using patient s name and date of birth. Please see Immunization Activity for additional information.     Screening Questionnaire for Adult Immunization    Are you sick today?   No   Do you have allergies to medications, food, a vaccine component or latex?   No   Have you ever had a serious reaction after receiving a vaccination?   No   Do you have a long-term health problem with heart, lung, kidney, or metabolic disease (e.g., diabetes), asthma, a blood disorder, no spleen, complement component deficiency, a cochlear implant, or a spinal fluid leak?  Are you on long-term aspirin therapy?   No   Do you have cancer, leukemia, HIV/AIDS, or any other immune system problem?   No   Do you have a parent, brother, or sister with an immune system problem?   No   In the past 3 months, have you taken medications that affect  your immune system, such as prednisone, other steroids, or anticancer drugs; drugs for the treatment of rheumatoid arthritis, Crohn s disease, or psoriasis; or have you had radiation treatments?   No   Have you had a seizure, or a brain or other nervous system problem?   No   During the past year, have you received a transfusion of blood or blood    products, or been given immune (gamma) globulin or antiviral drug?   No   For women: Are you pregnant or is there a chance you could become       pregnant during the next month?   No   Have you received any vaccinations in the past 4 weeks?   No     Immunization questionnaire answers were all negative.        Per orders of Dr. Ford, injection of Shingrix given by Irene Santiago CMA. Patient instructed to remain in clinic for 15 minutes afterwards, and to report any adverse reaction to me immediately.       Screening performed by Irene Santiago CMA on 6/7/2021 at 8:54 AM.

## 2021-06-23 LAB — COLOGUARD-ABSTRACT: NEGATIVE

## 2021-09-25 ENCOUNTER — HEALTH MAINTENANCE LETTER (OUTPATIENT)
Age: 66
End: 2021-09-25

## 2022-07-29 ENCOUNTER — OFFICE VISIT (OUTPATIENT)
Dept: URGENT CARE | Facility: URGENT CARE | Age: 67
End: 2022-07-29
Payer: COMMERCIAL

## 2022-07-29 VITALS
HEART RATE: 100 BPM | SYSTOLIC BLOOD PRESSURE: 129 MMHG | DIASTOLIC BLOOD PRESSURE: 87 MMHG | OXYGEN SATURATION: 99 % | TEMPERATURE: 97.6 F

## 2022-07-29 DIAGNOSIS — N89.8 VAGINAL ITCHING: Primary | ICD-10-CM

## 2022-07-29 LAB
ALBUMIN UR-MCNC: NEGATIVE MG/DL
APPEARANCE UR: CLEAR
BILIRUB UR QL STRIP: NEGATIVE
CLUE CELLS: NORMAL
COLOR UR AUTO: YELLOW
GLUCOSE UR STRIP-MCNC: NEGATIVE MG/DL
HGB UR QL STRIP: NEGATIVE
KETONES UR STRIP-MCNC: NEGATIVE MG/DL
LEUKOCYTE ESTERASE UR QL STRIP: ABNORMAL
NITRATE UR QL: NEGATIVE
PH UR STRIP: 5 [PH] (ref 5–7)
RBC #/AREA URNS AUTO: NORMAL /HPF
SP GR UR STRIP: <=1.005 (ref 1–1.03)
TRICHOMONAS, WET PREP: NORMAL
UROBILINOGEN UR STRIP-ACNC: 0.2 E.U./DL
WBC #/AREA URNS AUTO: NORMAL /HPF
WBC'S/HIGH POWER FIELD, WET PREP: NORMAL
YEAST, WET PREP: NORMAL

## 2022-07-29 PROCEDURE — 81001 URINALYSIS AUTO W/SCOPE: CPT

## 2022-07-29 PROCEDURE — 99213 OFFICE O/P EST LOW 20 MIN: CPT | Performed by: NURSE PRACTITIONER

## 2022-07-29 PROCEDURE — 87210 SMEAR WET MOUNT SALINE/INK: CPT

## 2022-07-29 NOTE — PROGRESS NOTES
Chief Complaint   Patient presents with     Urgent Care     Vaginal Problem     Patient had some recent diarrhea-now having uti sx Odor and vaginal itching-OTC Mono cream      SUBJECTIVE:   Mary Jane Matta is a 67 year old female who presents today with recent diarrhea, urinary tract infection symptoms, odor, vaginal itch. Has been using mono over the counter cream without much relief.    Past Medical History:   Diagnosis Date     Endometrial cancer (H) 09/2008    Stage I B    CATIA BSO     Urinary incontinence      Current Outpatient Medications   Medication Sig Dispense Refill     cholecalciferol (VITAMIN D3) 5000 units (125 mcg) capsule Take by mouth daily       LYSINE ACETATE PO        MAGNESIUM GLYCINATE PLUS PO        Multiple Vitamin (DAILY MULTIVITAMIN PO) Take by mouth daily Reported on 2/20/2017       omeprazole (PRILOSEC) 40 MG DR capsule Take 1 capsule (40 mg) by mouth daily 90 capsule 0     Social History     Tobacco Use     Smoking status: Never Smoker     Smokeless tobacco: Never Used   Substance Use Topics     Alcohol use: Yes     Comment: socially (3 drinks per week)     No Known Allergies    Review of Systems   All systems negative except for those listed above in HPI.    OBJECTIVE:  /87   Pulse 100   Temp 97.6  F (36.4  C) (Oral)   LMP 08/07/2008   SpO2 99%   Breastfeeding No      Physical Exam  Constitutional:       General: She is not in acute distress.     Appearance: She is well-developed. She is not ill-appearing, toxic-appearing or diaphoretic.   HENT:      Head: Normocephalic and atraumatic.   Eyes:      Conjunctiva/sclera: Conjunctivae normal.      Pupils: Pupils are equal, round, and reactive to light.   Cardiovascular:      Rate and Rhythm: Normal rate.   Pulmonary:      Effort: Pulmonary effort is normal. No respiratory distress.   Abdominal:      Tenderness: There is no right CVA tenderness or left CVA tenderness.   Musculoskeletal:         General: Normal range of motion.       Cervical back: Normal range of motion and neck supple.   Lymphadenopathy:      Cervical: No cervical adenopathy.   Skin:     General: Skin is warm.      Capillary Refill: Capillary refill takes less than 2 seconds.      Findings: No rash.   Neurological:      Mental Status: She is alert and oriented to person, place, and time.       Results for orders placed or performed in visit on 07/29/22   UA Macro with Reflex to Micro and Culture - lab collect     Status: Abnormal    Specimen: Urine, Midstream   Result Value Ref Range    Color Urine Yellow Colorless, Straw, Light Yellow, Yellow    Appearance Urine Clear Clear    Glucose Urine Negative Negative mg/dL    Bilirubin Urine Negative Negative    Ketones Urine Negative Negative mg/dL    Specific Gravity Urine <=1.005 1.003 - 1.035    Blood Urine Negative Negative    pH Urine 5.0 5.0 - 7.0    Protein Albumin Urine Negative Negative mg/dL    Urobilinogen Urine 0.2 0.2, 1.0 E.U./dL    Nitrite Urine Negative Negative    Leukocyte Esterase Urine Trace (A) Negative   Urine Microscopic     Status: Normal   Result Value Ref Range    RBC Urine None Seen 0-2 /HPF /HPF    WBC Urine None Seen 0-5 /HPF /HPF    Narrative    Urine Culture not indicated   Wet prep - lab collect     Status: Normal    Specimen: Vagina; Swab   Result Value Ref Range    Trichomonas Absent Absent    Yeast Absent Absent    Clue Cells Absent Absent    WBCs/high power field None None     ASSESSMENT:    ICD-10-CM    1. Vaginal itching  N89.8 Wet prep - lab collect     UA Macro with Reflex to Micro and Culture - lab collect     Wet prep - lab collect     UA Macro with Reflex to Micro and Culture - lab collect     Urine Microscopic     PLAN:   No urinary tract infection or yeast /bv on labwork  Discussed bladder irritants  Close monitoring  Follow-up with primary care provider if lingering    Follow up with primary care provider with any problems, questions or concerns or if symptoms worsen or fail to improve.  Patient agreed to plan and verbalized understanding.    Anika Mcdermott, LILLIEP-[facility] [place]

## 2022-08-27 ENCOUNTER — HEALTH MAINTENANCE LETTER (OUTPATIENT)
Age: 67
End: 2022-08-27

## 2022-09-23 ENCOUNTER — HOSPITAL ENCOUNTER (OUTPATIENT)
Dept: MAMMOGRAPHY | Facility: CLINIC | Age: 67
Discharge: HOME | End: 2022-09-23
Attending: FAMILY MEDICINE | Admitting: FAMILY MEDICINE
Payer: COMMERCIAL

## 2022-09-23 DIAGNOSIS — Z12.31 VISIT FOR SCREENING MAMMOGRAM: ICD-10-CM

## 2022-09-23 PROCEDURE — 77067 SCR MAMMO BI INCL CAD: CPT

## 2023-01-07 ENCOUNTER — HEALTH MAINTENANCE LETTER (OUTPATIENT)
Age: 68
End: 2023-01-07

## 2023-02-25 ENCOUNTER — E-VISIT (OUTPATIENT)
Dept: URGENT CARE | Facility: CLINIC | Age: 68
End: 2023-02-25
Payer: COMMERCIAL

## 2023-02-25 DIAGNOSIS — Z53.9 DIAGNOSIS NOT YET DEFINED: Primary | ICD-10-CM

## 2023-02-25 NOTE — PATIENT INSTRUCTIONS
Dear Mary Jane Matta,    We are sorry you are not feeling well. Unfortunately, we cannot treat you if you are outside the state of [place]. Please seek care locally there.    Babs Smyth PA-C

## 2023-03-29 SDOH — ECONOMIC STABILITY: FOOD INSECURITY: WITHIN THE PAST 12 MONTHS, THE FOOD YOU BOUGHT JUST DIDN'T LAST AND YOU DIDN'T HAVE MONEY TO GET MORE.: NEVER TRUE

## 2023-03-29 SDOH — ECONOMIC STABILITY: FOOD INSECURITY: WITHIN THE PAST 12 MONTHS, YOU WORRIED THAT YOUR FOOD WOULD RUN OUT BEFORE YOU GOT MONEY TO BUY MORE.: NEVER TRUE

## 2023-03-29 SDOH — ECONOMIC STABILITY: TRANSPORTATION INSECURITY
IN THE PAST 12 MONTHS, HAS THE LACK OF TRANSPORTATION KEPT YOU FROM MEDICAL APPOINTMENTS OR FROM GETTING MEDICATIONS?: NO

## 2023-03-29 SDOH — ECONOMIC STABILITY: INCOME INSECURITY: IN THE LAST 12 MONTHS, WAS THERE A TIME WHEN YOU WERE NOT ABLE TO PAY THE MORTGAGE OR RENT ON TIME?: NO

## 2023-03-29 SDOH — HEALTH STABILITY: PHYSICAL HEALTH: ON AVERAGE, HOW MANY MINUTES DO YOU ENGAGE IN EXERCISE AT THIS LEVEL?: 0 MIN

## 2023-03-29 SDOH — ECONOMIC STABILITY: TRANSPORTATION INSECURITY
IN THE PAST 12 MONTHS, HAS LACK OF TRANSPORTATION KEPT YOU FROM MEETINGS, WORK, OR FROM GETTING THINGS NEEDED FOR DAILY LIVING?: NO

## 2023-03-29 SDOH — HEALTH STABILITY: PHYSICAL HEALTH: ON AVERAGE, HOW MANY DAYS PER WEEK DO YOU ENGAGE IN MODERATE TO STRENUOUS EXERCISE (LIKE A BRISK WALK)?: 0 DAYS

## 2023-03-29 SDOH — ECONOMIC STABILITY: INCOME INSECURITY: HOW HARD IS IT FOR YOU TO PAY FOR THE VERY BASICS LIKE FOOD, HOUSING, MEDICAL CARE, AND HEATING?: NOT HARD AT ALL

## 2023-03-29 ASSESSMENT — LIFESTYLE VARIABLES
SKIP TO QUESTIONS 9-10: 1
HOW OFTEN DO YOU HAVE SIX OR MORE DRINKS ON ONE OCCASION: NEVER
AUDIT-C TOTAL SCORE: 2
HOW OFTEN DO YOU HAVE A DRINK CONTAINING ALCOHOL: 2-4 TIMES A MONTH
HOW MANY STANDARD DRINKS CONTAINING ALCOHOL DO YOU HAVE ON A TYPICAL DAY: 1 OR 2

## 2023-03-29 ASSESSMENT — SOCIAL DETERMINANTS OF HEALTH (SDOH)
DO YOU BELONG TO ANY CLUBS OR ORGANIZATIONS SUCH AS CHURCH GROUPS UNIONS, FRATERNAL OR ATHLETIC GROUPS, OR SCHOOL GROUPS?: NO
HOW OFTEN DO YOU GET TOGETHER WITH FRIENDS OR RELATIVES?: TWICE A WEEK
HOW OFTEN DO YOU ATTEND CHURCH OR RELIGIOUS SERVICES?: MORE THAN 4 TIMES PER YEAR
IN A TYPICAL WEEK, HOW MANY TIMES DO YOU TALK ON THE PHONE WITH FAMILY, FRIENDS, OR NEIGHBORS?: MORE THAN THREE TIMES A WEEK

## 2023-03-29 ASSESSMENT — PATIENT HEALTH QUESTIONNAIRE - PHQ9
SUM OF ALL RESPONSES TO PHQ QUESTIONS 1-9: 23
SUM OF ALL RESPONSES TO PHQ QUESTIONS 1-9: 23

## 2023-03-30 ENCOUNTER — OFFICE VISIT (OUTPATIENT)
Dept: FAMILY MEDICINE | Facility: CLINIC | Age: 68
End: 2023-03-30
Payer: COMMERCIAL

## 2023-03-30 ENCOUNTER — TELEPHONE (OUTPATIENT)
Dept: FAMILY MEDICINE | Facility: CLINIC | Age: 68
End: 2023-03-30

## 2023-03-30 VITALS
WEIGHT: 180.3 LBS | SYSTOLIC BLOOD PRESSURE: 126 MMHG | OXYGEN SATURATION: 98 % | BODY MASS INDEX: 27.32 KG/M2 | DIASTOLIC BLOOD PRESSURE: 86 MMHG | TEMPERATURE: 98 F | HEART RATE: 107 BPM | HEIGHT: 68 IN | RESPIRATION RATE: 16 BRPM

## 2023-03-30 DIAGNOSIS — K21.00 GASTROESOPHAGEAL REFLUX DISEASE WITH ESOPHAGITIS, UNSPECIFIED WHETHER HEMORRHAGE: ICD-10-CM

## 2023-03-30 DIAGNOSIS — Z78.0 ASYMPTOMATIC MENOPAUSE: ICD-10-CM

## 2023-03-30 DIAGNOSIS — M85.80 OSTEOPENIA, UNSPECIFIED LOCATION: ICD-10-CM

## 2023-03-30 DIAGNOSIS — F43.21 GRIEF: ICD-10-CM

## 2023-03-30 DIAGNOSIS — Z00.00 ENCOUNTER FOR MEDICARE ANNUAL WELLNESS EXAM: Primary | ICD-10-CM

## 2023-03-30 LAB
ALBUMIN SERPL BCG-MCNC: 4.4 G/DL (ref 3.5–5.2)
ALP SERPL-CCNC: 100 U/L (ref 35–104)
ALT SERPL W P-5'-P-CCNC: 15 U/L (ref 10–35)
ANION GAP SERPL CALCULATED.3IONS-SCNC: 12 MMOL/L (ref 7–15)
AST SERPL W P-5'-P-CCNC: 21 U/L (ref 10–35)
BILIRUB SERPL-MCNC: 0.3 MG/DL
BUN SERPL-MCNC: 21.7 MG/DL (ref 8–23)
CALCIUM SERPL-MCNC: 9.3 MG/DL (ref 8.8–10.2)
CHLORIDE SERPL-SCNC: 102 MMOL/L (ref 98–107)
CREAT SERPL-MCNC: 0.87 MG/DL (ref 0.51–0.95)
DEPRECATED HCO3 PLAS-SCNC: 24 MMOL/L (ref 22–29)
ERYTHROCYTE [DISTWIDTH] IN BLOOD BY AUTOMATED COUNT: 12.8 % (ref 10–15)
GFR SERPL CREATININE-BSD FRML MDRD: 73 ML/MIN/1.73M2
GLUCOSE SERPL-MCNC: 87 MG/DL (ref 70–99)
HCT VFR BLD AUTO: 43.3 % (ref 35–47)
HGB BLD-MCNC: 14.5 G/DL (ref 11.7–15.7)
MCH RBC QN AUTO: 32 PG (ref 26.5–33)
MCHC RBC AUTO-ENTMCNC: 33.5 G/DL (ref 31.5–36.5)
MCV RBC AUTO: 96 FL (ref 78–100)
PLATELET # BLD AUTO: 332 10E3/UL (ref 150–450)
POTASSIUM SERPL-SCNC: 4 MMOL/L (ref 3.4–5.3)
PROT SERPL-MCNC: 7.5 G/DL (ref 6.4–8.3)
RBC # BLD AUTO: 4.53 10E6/UL (ref 3.8–5.2)
SODIUM SERPL-SCNC: 138 MMOL/L (ref 136–145)
TSH SERPL DL<=0.005 MIU/L-ACNC: 2.25 UIU/ML (ref 0.3–4.2)
WBC # BLD AUTO: 9.9 10E3/UL (ref 4–11)

## 2023-03-30 PROCEDURE — G0438 PPPS, INITIAL VISIT: HCPCS | Performed by: FAMILY MEDICINE

## 2023-03-30 PROCEDURE — 85027 COMPLETE CBC AUTOMATED: CPT | Performed by: FAMILY MEDICINE

## 2023-03-30 PROCEDURE — 84443 ASSAY THYROID STIM HORMONE: CPT | Performed by: FAMILY MEDICINE

## 2023-03-30 PROCEDURE — 36415 COLL VENOUS BLD VENIPUNCTURE: CPT | Performed by: FAMILY MEDICINE

## 2023-03-30 PROCEDURE — 80053 COMPREHEN METABOLIC PANEL: CPT | Performed by: FAMILY MEDICINE

## 2023-03-30 PROCEDURE — 99214 OFFICE O/P EST MOD 30 MIN: CPT | Mod: 25 | Performed by: FAMILY MEDICINE

## 2023-03-30 RX ORDER — OMEPRAZOLE 40 MG/1
40 CAPSULE, DELAYED RELEASE ORAL DAILY
Qty: 90 CAPSULE | Refills: 1 | Status: SHIPPED | OUTPATIENT
Start: 2023-03-30

## 2023-03-30 ASSESSMENT — ENCOUNTER SYMPTOMS
SHORTNESS OF BREATH: 0
COUGH: 0
SORE THROAT: 0
JOINT SWELLING: 0
CHILLS: 0
DYSURIA: 0
BREAST MASS: 0
CONSTIPATION: 0
PALPITATIONS: 1
FREQUENCY: 0
NAUSEA: 0
ABDOMINAL PAIN: 0
HEARTBURN: 1
NERVOUS/ANXIOUS: 1
EYE PAIN: 0
DIARRHEA: 1
WEAKNESS: 0
HEMATOCHEZIA: 0
MYALGIAS: 0
HEADACHES: 0
PARESTHESIAS: 0
ARTHRALGIAS: 0
DIZZINESS: 0
HEMATURIA: 0
FEVER: 0

## 2023-03-30 ASSESSMENT — PAIN SCALES - GENERAL: PAINLEVEL: NO PAIN (0)

## 2023-03-30 ASSESSMENT — PATIENT HEALTH QUESTIONNAIRE - PHQ9
SUM OF ALL RESPONSES TO PHQ QUESTIONS 1-9: 23
10. IF YOU CHECKED OFF ANY PROBLEMS, HOW DIFFICULT HAVE THESE PROBLEMS MADE IT FOR YOU TO DO YOUR WORK, TAKE CARE OF THINGS AT HOME, OR GET ALONG WITH OTHER PEOPLE: SOMEWHAT DIFFICULT

## 2023-03-30 ASSESSMENT — ACTIVITIES OF DAILY LIVING (ADL): CURRENT_FUNCTION: NO ASSISTANCE NEEDED

## 2023-03-30 NOTE — PROGRESS NOTES
"  SUBJECTIVE:   Mary Jane is a 67 year old who presents for Preventive Visit.  No flowsheet data found.Patient has been advised of split billing requirements and indicates understanding: Yes  Are you in the first 12 months of your Medicare coverage?  No    Healthy Habits:     In general, how would you rate your overall health?  Good    Frequency of exercise:  None    Do you usually eat at least 4 servings of fruit and vegetables a day, include whole grains    & fiber and avoid regularly eating high fat or \"junk\" foods?  No    Taking medications regularly:  Yes    Ability to successfully perform activities of daily living:  No assistance needed    Home Safety:  No safety concerns identified    Hearing Impairment:  No hearing concerns    In the past 6 months, have you been bothered by leaking of urine?  No    In general, how would you rate your overall mental or emotional health?  Fair      PHQ-2 Total Score: 5    Additional concerns today:  Yes    Have you ever done Advance Care Planning? (For example, a Health Directive, POLST, or a discussion with a medical provider or your loved ones about your wishes): Yes, patient states has an Advance Care Planning document and will bring a copy to the clinic.     Fall risk  Fallen 2 or more times in the past year?: No  Any fall with injury in the past year?: No    Cognitive Screening   1) Repeat 3 items (Leader, Season, Table)    2) Clock draw: NORMAL  3) 3 item recall: Recalls 3 objects  Results: 3 items recalled: COGNITIVE IMPAIRMENT LESS LIKELY    Mini-CogTM Copyright S Esteban. Licensed by the author for use in [facility]; reprinted with permission ([email or web address]). All rights reserved.      Do you have sleep apnea, excessive snoring or daytime drowsiness?: no    Reviewed and updated as needed this visit by clinical staff   Tobacco  Allergies  Meds              Reviewed and updated as needed this visit by Provider                 Social History     Tobacco Use "     Smoking status: Never     Smokeless tobacco: Never   Substance Use Topics     Alcohol use: Yes     Comment: socially (3 drinks per week)         Alcohol Use 3/29/2023   Prescreen: >3 drinks/day or >7 drinks/week? No     Do you have a current opioid prescription? No  Do you use any other controlled substances or medications that are not prescribed by a provider? Alcohol    Current providers sharing in care for this patient include:   Patient Care Team:  Laura Ford MD as PCP - General (Family Practice)  Laura Ford MD as Assigned PCP    The following health maintenance items are reviewed in Epic and correct as of today:  Health Maintenance   Topic Date Due     MIGRAINE ACTION PLAN  Never done     DEXA  2020     Pneumococcal Vaccine: 65+ Years (1 - PCV) Never done     MEDICARE ANNUAL WELLNESS VISIT  2022     ANNUAL REVIEW OF HM ORDERS  2022     INFLUENZA VACCINE (1) 2022     FALL RISK ASSESSMENT  2024     COLORECTAL CANCER SCREENING  2024     MAMMO SCREENING  2024     LIPID  2026     ADVANCE CARE PLANNING  2026     DTAP/TDAP/TD IMMUNIZATION (3 - Td or Tdap) 2028     HEPATITIS C SCREENING  Completed     PHQ-2 (once per calendar year)  Completed     ZOSTER IMMUNIZATION  Completed     COVID-19 Vaccine  Completed     IPV IMMUNIZATION  Aged Out     MENINGITIS IMMUNIZATION  Aged Out     PAP  Discontinued     Patient Active Problem List   Diagnosis     Herpes labialis     Osteopenia     Advanced directives, counseling/discussion     GERD (gastroesophageal reflux disease)     History of endometrial cancer     Pain in joint, lower leg     Past Surgical History:   Procedure Laterality Date     CHOLECYSTECTOMY  2015     HC TOOTH EXTRACTION W/FORCEP       SURGICAL HISTORY OF -       3       TONSILLECTOMY       ZZC TOTAL ABDOM HYSTERECTOMY      Dr Len BARDALES BSO staging for Stage 1 B endometrial Ca       Social History     Tobacco Use      Smoking status: Never     Smokeless tobacco: Never   Substance Use Topics     Alcohol use: Yes     Comment: socially (3 drinks per week)     Family History   Problem Relation Age of Onset     Circulatory Father          at an early age of annurysm-brain- at 45     Alcohol/Drug Father      Heart Disease Maternal Grandmother         UNKNWON DETAILS     Hypertension Maternal Grandmother      Cerebrovascular Disease Maternal Grandmother      Diabetes Paternal Grandmother      Hypertension Paternal Grandmother      Cerebrovascular Disease Paternal Grandmother      Family History Negative Mother         alive 79 yo old     Osteoporosis Mother      Family History Negative Maternal Grandfather          in accident     Family History Negative Paternal Grandfather      Family History Negative Brother         alive 58 , smoker     Alcohol/Drug Brother         one bother only      Heart Disease Brother      Breast Cancer No family hx of      Cancer - colorectal No family hx of              Breast CA Risk Assessment (FHS-7) 3/29/2023   Do you have a family history of breast, colon, or ovarian cancer? No / Unknown       Mammogram Screening: Recommended mammography every 1-2 years with patient discussion and risk factor consideration  Pertinent mammograms are reviewed under the imaging tab.    Review of Systems   Constitutional: Negative for chills and fever.   HENT: Negative for congestion, ear pain, hearing loss and sore throat.    Eyes: Negative for pain and visual disturbance.   Respiratory: Negative for cough and shortness of breath.    Cardiovascular: Positive for palpitations. Negative for chest pain and peripheral edema.   Gastrointestinal: Positive for diarrhea and heartburn. Negative for abdominal pain, constipation, hematochezia and nausea.   Breasts:  Negative for tenderness, breast mass and discharge.   Genitourinary: Negative for dysuria, frequency, genital sores, hematuria, pelvic pain, urgency, vaginal  "bleeding and vaginal discharge.   Musculoskeletal: Negative for arthralgias, joint swelling and myalgias.   Skin: Negative for rash.   Neurological: Negative for dizziness, weakness, headaches and paresthesias.   Psychiatric/Behavioral: Negative for mood changes. The patient is nervous/anxious.        OBJECTIVE:   /86   Pulse 107   Temp 98  F (36.7  C) (Oral)   Resp 16   Ht 1.727 m (5' 8\")   Wt 81.8 kg (180 lb 4.8 oz)   LMP 08/07/2008   SpO2 98%   BMI 27.41 kg/m   Estimated body mass index is 27.41 kg/m  as calculated from the following:    Height as of this encounter: 1.727 m (5' 8\").    Weight as of this encounter: 81.8 kg (180 lb 4.8 oz).  Physical Exam  GENERAL APPEARANCE: healthy, alert and no distress  EYES: Eyes grossly normal to inspection, PERRL and conjunctivae and sclerae normal  HENT: ear canals and TM's normal, nose and mouth without ulcers or lesions, oropharynx clear and oral mucous membranes moist  NECK: no adenopathy, no asymmetry, masses, or scars and thyroid normal to palpation  RESP: lungs clear to auscultation - no rales, rhonchi or wheezes  BREAST: normal without masses, tenderness or nipple discharge and no palpable axillary masses or adenopathy  CV: regular rate and rhythm, normal S1 S2, no S3 or S4, no murmur, click or rub, no peripheral edema and peripheral pulses strong  ABDOMEN: soft, nontender, no hepatosplenomegaly, no masses and bowel sounds normal  MS: no musculoskeletal defects are noted and gait is age appropriate without ataxia  SKIN: no suspicious lesions or rashes  NEURO: Normal strength and tone, sensory exam grossly normal, mentation intact and speech normal  PSYCH: mentation appears normal and affect normal/bright    Diagnostic Test Results:  Labs reviewed in Epic    ASSESSMENT / PLAN:     1. Encounter for Medicare annual wellness exam  - Comprehensive metabolic panel (BMP + Alb, Alk Phos, ALT, AST, Total. Bili, TP); Future  - CBC with platelets; Future  - TSH " with free T4 reflex; Future  - Comprehensive metabolic panel (BMP + Alb, Alk Phos, ALT, AST, Total. Bili, TP)  - CBC with platelets  - TSH with free T4 reflex    2. Osteopenia, unspecified location    3. Gastroesophageal reflux disease with esophagitis, unspecified whether hemorrhage - advised she resume med as she is still having frequent symptoms.   - omeprazole (PRILOSEC) 40 MG DR capsule; Take 1 capsule (40 mg) by mouth daily  Dispense: 90 capsule; Refill: 1    4. Grief - encouraged med start in addition to therapy. She is open to this. Will have staff call in a month to assess progress.   - sertraline (ZOLOFT) 50 MG tablet; Take 25 mg daily for 3 weeks, then increase to 50 mg daily  Dispense: 30 tablet; Refill: 3    5. Asymptomatic menopause  - DEXA HIP/PELVIS/SPINE - Future; Future      COUNSELING:  Reviewed preventive health counseling, as reflected in patient instructions        She reports that she has never smoked. She has never used smokeless tobacco.      Appropriate preventive services were discussed with this patient, including applicable screening as appropriate for cardiovascular disease, diabetes, osteopenia/osteoporosis, and glaucoma.  As appropriate for age/gender, discussed screening for colorectal cancer, prostate cancer, breast cancer, and cervical cancer. Checklist reviewing preventive services available has been given to the patient.    Reviewed patients plan of care and provided an AVS. The Basic Care Plan (routine screening as documented in Health Maintenance) for Mary Jane meets the Care Plan requirement. This Care Plan has been established and reviewed with the Patient.      Laura Ford MD  [facility]    Identified Health Risks:    eeks to further address this issue.

## 2023-03-30 NOTE — PATIENT INSTRUCTIONS
"  Patient Education   Personalized Prevention Plan  You are due for the preventive services outlined below.  Your care team is available to assist you in scheduling these services.  If you have already completed any of these items, please share that information with your care team to update in your medical record.  Health Maintenance Due   Topic Date Due     Migraine Action Plan  Never done     Osteoporosis Screening  02/20/2020     Pneumococcal Vaccine (1 - PCV) Never done     Annual Wellness Visit  06/07/2022     ANNUAL REVIEW OF HM ORDERS  06/07/2022     Flu Vaccine (1) 09/01/2022       Depression and Suicide in Older Adults  Nearly 2 million older adults in the [place] have some type of depression. Some of them even take their own lives. Yet depression among older adults is often ignored. Learning about the warning signs of depression may help spare a loved one needless pain. You may also save a life.   What is depression?  Depression is a common and serious illness. It affects the way you think and feel. It is not a normal part of aging. It is not a sign of weakness, a character flaw, or something you can \"snap out of.\" Most people with depression need treatment to get better. The most common symptom is a feeling of deep sadness. People who are depressed also may seem tired and listless. And nothing seems to give them pleasure. It s normal to grieve or be sad sometimes. But sadness lessens or passes with time. Depression rarely goes away or improves on its own. Other symptoms of depression are:     Sleeping more or less than normal    Eating more or less than normal    Having headaches, stomachaches, or other pains that don t go away    Feeling nervous,  empty,  or worthless    Crying a lot    Thinking or talking about suicide or death    Loss of interest in activities previously enjoyed    Social isolation    Feeling confused or forgetful  What causes it?  The causes of depression aren t fully known. But it is " thought to result from a complex blend of these factors:     Biochemistry. Certain chemicals in the brain play a role.    Genes. Depression does run in families.    Life stress. Life stresses can also trigger depression in some people. Older adults often face many stressors. These may include isolation, the death of friends or a spouse, health problems, and financial concerns.    Chronic health conditions. This includes diabetes, heart disease, or cancer. These can cause symptoms of depression. Medicine side effects can cause changes in thoughts and behaviors.  Giving support    Depressed people often may not want to ask for help. When they do, they may be ignored. Or they may get the wrong treatment. You can help by showing parents and older friends love and support. If they seem depressed, don t lecture the person or ignore the symptoms. Don't discount the symptoms as a  normal  part of aging. They are not. Get involved, listen, and show interest and support.   Help them understand that depression is a treatable illness. Tell them you can help them find the right treatment. Offer to go to their healthcare provider's appointment with them for support when the symptoms are discussed. With their approval, contact a local mental health center, social service agency, or hospital about services.   Helping at healthcare visits  You can be an advocate for them at healthcare appointments. Many older adults have chronic illnesses. Many of these can cause symptoms of depression. Medicine side effects can change thoughts and behaviors.   You can help make sure that the healthcare provider looks at all of these factors. They should refer your family member or friend to a mental healthcare provider when needed. In some cases, untreated depression can lead to a misdiagnosis. A person may be diagnosed with a brain disorder such as dementia. If the healthcare provider does not take the issue of depression seriously, help your  family member or friend find another provider.   Asking about self-harm thoughts  If you think an older person you care about could be suicidal, ask,  Have you thought about suicide?  Most people will tell you the truth. If they say yes, they may already have a plan for how and when they will attempt it. Find out as much as you can. The more detailed the plan, and the easier it is to carry out, the more danger the person is in right now. Tell the person you are there for them and you do not want them to get harmed. Don't wait to get help for the person. Call the person's healthcare provider, local hospital, or emergency services.   Call 988 in a crisis   Never leave the person alone. A person who is actively suicidal needs crisis care right away. They need constant supervision. Never leave the person out of sight. Call 988 Tell the crisis counselor you need help for a person who is thinking about suicide. The counselor will help you get the right level of crisis help. You may be advised to take the person to the nearest emergency room.   The National Suicide Prevention Lifeline is available at 988, or 970-559-EBXL (303-605-2104), or www.suicidepreventionlifeline.org. When you call or text 988, you will be connected to trained counselors who are part of the National Suicide Prevention Lifeline network. An online chat option is also available. Lifeline is free and available 24/7.   To learn more    National Suicide Prevention Lifeline at www.suicidepreventionlifeline.org  or 234-886-TARU (062-260-9626)    National [place] on Mental Illness at www.marciano.org  or 410-332-PWPM (567-962-2522)    [facility] [employer] at www.nmha.org  or 958-112-6310    National [place] of Mental Health at www.nimh.nih.gov  or 435-405-2416    Michelle last reviewed this educational content on 7/1/2022 2000-2022 [employer]. All rights reserved. This information is not intended as a substitute for professional medical  care. Always follow your healthcare professional's instructions.

## 2023-03-30 NOTE — TELEPHONE ENCOUNTER
Laura Ford MD  P Lv Triage  Please call in a month to see how she is doing with sertraline. She is not a PAL4

## 2023-04-27 NOTE — TELEPHONE ENCOUNTER
Pt stopped sertraline per 4/20 mychart. Need to see how she is doing overall     [facility]    Edel BENITES RN, BSN

## 2023-04-27 NOTE — TELEPHONE ENCOUNTER
Pt called back and states she is doing ok. She is able to sleep. Not having headaches. Goes to therapy. If she needs the effexor she will contact the clinic.  Edel BENITES RN, BSN

## 2023-10-23 ENCOUNTER — OFFICE VISIT (OUTPATIENT)
Dept: FAMILY MEDICINE | Facility: CLINIC | Age: 68
End: 2023-10-23
Payer: COMMERCIAL

## 2023-10-23 VITALS
HEIGHT: 67 IN | OXYGEN SATURATION: 98 % | DIASTOLIC BLOOD PRESSURE: 82 MMHG | HEART RATE: 94 BPM | TEMPERATURE: 98.1 F | RESPIRATION RATE: 16 BRPM | SYSTOLIC BLOOD PRESSURE: 124 MMHG | WEIGHT: 188 LBS | BODY MASS INDEX: 29.51 KG/M2

## 2023-10-23 DIAGNOSIS — Z23 NEEDS FLU SHOT: ICD-10-CM

## 2023-10-23 DIAGNOSIS — R21 RASH: Primary | ICD-10-CM

## 2023-10-23 PROCEDURE — 99213 OFFICE O/P EST LOW 20 MIN: CPT | Performed by: PHYSICIAN ASSISTANT

## 2023-10-23 RX ORDER — RESPIRATORY SYNCYTIAL VIRUS VACCINE 120MCG/0.5
0.5 KIT INTRAMUSCULAR ONCE
Qty: 1 EACH | Refills: 0 | Status: CANCELLED | OUTPATIENT
Start: 2023-10-23 | End: 2023-10-23

## 2023-10-23 RX ORDER — VALACYCLOVIR HYDROCHLORIDE 500 MG/1
1 TABLET, FILM COATED ORAL
COMMUNITY
Start: 2023-09-05

## 2023-10-23 RX ORDER — TRIAMCINOLONE ACETONIDE 1 MG/G
CREAM TOPICAL 2 TIMES DAILY
Qty: 30 G | Refills: 0 | Status: SHIPPED | OUTPATIENT
Start: 2023-10-23

## 2023-10-23 RX ORDER — HYDROXYZINE HYDROCHLORIDE 10 MG/1
10-20 TABLET, FILM COATED ORAL EVERY 6 HOURS PRN
Qty: 30 TABLET | Refills: 1 | Status: SHIPPED | OUTPATIENT
Start: 2023-10-23 | End: 2024-02-06 | Stop reason: SINTOL

## 2023-10-23 ASSESSMENT — PAIN SCALES - GENERAL: PAINLEVEL: NO PAIN (0)

## 2023-10-23 NOTE — PATIENT INSTRUCTIONS
Stop neosporin and hydrogen peroxide    Use cool compress     Add triamcinolone - apply thin layer to affected areas twice daily until improved. Once soaked in, apply fragrance free moisturizer.     Hydroxyzine 10-20 mg by mouth every 6 hours if needed for itching    OK to use claritin/zyrtec/allegra once daily if needed for itching during the day

## 2023-10-23 NOTE — PROGRESS NOTES
Assessment & Plan     Rash  Most likely irritation from neosporin + hydrogen peroxide combo - advised stopping these. Will treat with triamcinolone and fragrance free moisturizer. Hydroxyzine prn itching. May use claritin/zyrtec/allegra during the day for itch as well. Follow up if not improving.   - triamcinolone (KENALOG) 0.1 % external cream  Dispense: 30 g; Refill: 0  - hydrOXYzine (ATARAX) 10 MG tablet  Dispense: 30 tablet; Refill: 1    Needs flu shot  Patient left before flu shot was given.     Neena Cooper PA-C  [facility] JUDITH Hinton is a 68 year old, presenting for the following health issues:  Rash (Rash on chest, that seems to be spreading. Causing her to itch a lot. ) and Blister (Fever blisters on the face. )        10/23/2023     3:40 PM   Additional Questions   Roomed by Charley DE       Rash  Associated symptoms include a rash.   History of Present Illness       Reason for visit:  Rash/sores on chest  Symptom onset:  3-4 weeks ago  Symptoms include:  Itching started in the last week after having a sore on chest for over a month  Symptom intensity:  Moderate  Symptom progression:  Staying the same  Had these symptoms before:  Yes  Has tried/received treatment for these symptoms:  No    She eats 2-3 servings of fruits and vegetables daily.She consumes 0 sweetened beverage(s) daily.She exercises with enough effort to increase her heart rate 9 or less minutes per day.  She exercises with enough effort to increase her heart rate 3 or less days per week.   She is taking medications regularly.     Rash   Had a couple pimples on chest that she was picking at repeatedly which are improving   A couple weeks of itchy rash on chest now   Similar rash after returning from [place] last year   No recent illness  No new medications  No new soap, lotion, detergent   Was applying neosporin and hydrogen peroxide  Used cortisone a couple nights ago - this burned      Review of Systems  "  Skin:  Positive for rash.      Constitutional, HEENT, cardiovascular, pulmonary, gi and gu systems are negative, except as otherwise noted.      Objective    /82   Pulse 94   Temp 98.1  F (36.7  C) (Temporal)   Resp 16   Ht 1.7 m (5' 6.93\")   Wt 85.3 kg (188 lb)   LMP 08/07/2008   SpO2 98%   BMI 29.51 kg/m    Body mass index is 29.51 kg/m .  Physical Exam   GENERAL: healthy, alert and no distress  SKIN: erythematous slightly raised excoriated rash on anterior chest without vesicles, purulence, drainage or bleeding.   NEURO: Normal strength and tone, mentation intact and speech normal  PSYCH: mentation appears normal, affect normal/bright          "

## 2023-12-11 ENCOUNTER — OFFICE VISIT (OUTPATIENT)
Dept: FAMILY MEDICINE | Facility: CLINIC | Age: 68
End: 2023-12-11
Payer: COMMERCIAL

## 2023-12-11 VITALS
OXYGEN SATURATION: 100 % | HEART RATE: 83 BPM | TEMPERATURE: 98.1 F | WEIGHT: 187.8 LBS | RESPIRATION RATE: 18 BRPM | DIASTOLIC BLOOD PRESSURE: 88 MMHG | BODY MASS INDEX: 28.46 KG/M2 | HEIGHT: 68 IN | SYSTOLIC BLOOD PRESSURE: 138 MMHG

## 2023-12-11 DIAGNOSIS — M25.511 RIGHT SHOULDER PAIN, UNSPECIFIED CHRONICITY: Primary | ICD-10-CM

## 2023-12-11 DIAGNOSIS — M54.50 LUMBAR BACK PAIN: ICD-10-CM

## 2023-12-11 PROCEDURE — 99214 OFFICE O/P EST MOD 30 MIN: CPT | Performed by: FAMILY MEDICINE

## 2023-12-11 RX ORDER — METHYLPREDNISOLONE 4 MG
TABLET, DOSE PACK ORAL
Qty: 21 TABLET | Refills: 0 | Status: SHIPPED | OUTPATIENT
Start: 2023-12-11 | End: 2024-02-06

## 2023-12-11 RX ORDER — CYCLOBENZAPRINE HCL 5 MG
5 TABLET ORAL
Qty: 30 TABLET | Refills: 0 | Status: SHIPPED | OUTPATIENT
Start: 2023-12-11 | End: 2024-08-08

## 2023-12-11 NOTE — PROGRESS NOTES
"  Assessment & Plan     Right shoulder pain, unspecified chronicity  Limited active and passive movement    - methylPREDNISolone (MEDROL DOSEPAK) 4 MG tablet therapy pack; Follow Package Directions  - Physical Therapy Referral; Future    Lumbar back pain    - cyclobenzaprine (FLEXERIL) 5 MG tablet; Take 1 tablet (5 mg) by mouth nightly as needed for muscle spasms    Discussed tylenol , prn ibuprofen .     BMI:   Estimated body mass index is 28.55 kg/m  as calculated from the following:    Height as of this encounter: 1.727 m (5' 8\").    Weight as of this encounter: 85.2 kg (187 lb 12.8 oz).   Weight management plan: Discussed healthy diet and exercise guidelines        Diamond Huitron MD  [facility] a 68 year old, presenting for the following health issues:  Musculoskeletal Problem (Right shoulder)  Decreased active and passive movement .        12/11/2023     4:08 PM   Additional Questions   Roomed by Italia       History of Present Illness       Reason for visit:  Shoulder pain  Symptom onset:  3-4 weeks ago  Symptoms include:  Weakness, pain  Symptom intensity:  Severe  Symptom progression:  Worsening  Had these symptoms before:  No    She eats 2-3 servings of fruits and vegetables daily.She consumes 1 sweetened beverage(s) daily.She exercises with enough effort to increase her heart rate 9 or less minutes per day.  She exercises with enough effort to increase her heart rate 3 or less days per week.   She is taking medications regularly.     Hard to sleep at night, pain when lifting ar        Review of Systems   Constitutional, HEENT, cardiovascular, pulmonary, GI, , musculoskeletal, neuro, skin, endocrine and psych systems are negative, except as otherwise noted.      Objective    /88   Pulse 83   Temp 98.1  F (36.7  C) (Tympanic)   Resp 18   Ht 1.727 m (5' 8\")   Wt 85.2 kg (187 lb 12.8 oz)   LMP 08/07/2008   SpO2 100%   BMI 28.55 kg/m    Body mass " index is 28.55 kg/m .  Physical Exam   GENERAL: healthy, alert and no distress    CV: regular rate and rhythm, normal S1 S2, no S3 or S4, no murmur, click or rub, no peripheral edema and peripheral pulses strong  ABDOMEN: soft, nontender, no hepatosplenomegaly, no masses and bowel sounds normal  MS: no gross musculoskeletal defects noted, no edema  Limited active and passive movement right shoulder .  Limited flexion extension lower back .

## 2023-12-13 ENCOUNTER — THERAPY VISIT (OUTPATIENT)
Dept: PHYSICAL THERAPY | Facility: CLINIC | Age: 68
End: 2023-12-13
Attending: FAMILY MEDICINE
Payer: COMMERCIAL

## 2023-12-13 DIAGNOSIS — M25.511 RIGHT SHOULDER PAIN, UNSPECIFIED CHRONICITY: ICD-10-CM

## 2023-12-13 PROCEDURE — 97112 NEUROMUSCULAR REEDUCATION: CPT | Mod: GP | Performed by: PHYSICAL THERAPIST

## 2023-12-13 PROCEDURE — 97161 PT EVAL LOW COMPLEX 20 MIN: CPT | Mod: GP | Performed by: PHYSICAL THERAPIST

## 2023-12-13 PROCEDURE — 97110 THERAPEUTIC EXERCISES: CPT | Mod: GP | Performed by: PHYSICAL THERAPIST

## 2023-12-13 NOTE — PROGRESS NOTES
"PHYSICAL THERAPY EVALUATION  Type of Visit: Evaluation    See electronic medical record for Abuse and Falls Screening details.    Subjective       Presenting condition or subjective complaint: Inability to use rt arm due to pain amd weakness  Patient states that her R arm has been bothering her for over a year. Currently it is tolerable because she is taking Prednisone and muscle relaxant medications due to her back pain and recent bulged disc. She describes her R arm pain as a \"tooth ache\" that limits her motion. She feels like she injured her shoulder doing repetitive movements such as turning reaching to lift her purse out of the backseat of her car. She has felt that her symptoms have been limiting her ability to sleep because her arm is very achy at night  Pt is R hand dominant.   Date of onset: 12/11/23 (order date)    Relevant medical history: Cancer; Migraines or headaches; Overweight; Pain at night or rest; Severe headaches   Dates & types of surgery:  none reported    Prior diagnostic imaging/testing results:     none reported  Prior therapy history for the same diagnosis, illness or injury: No      Prior Level of Function  Independent    Living Environment  Social support: With a significant other or spouse   Type of home: House   Stairs to enter the home: Yes 2 Is there a railing: Yes   Ramp: No   Stairs inside the home: Yes 12 Is there a railing: Yes   Help at home: None  Equipment owned:       Employment: No    Hobbies/Interests:      Patient goals for therapy: use my arm; would like to return to regular exercise routine    Pain assessment: Pain present in R arm and low back     Objective   SHOULDER EVALUATION  POSTURE: Sitting Posture: Rounded shoulders, Forward head, Thoracic kyphosis increased mildly  ROM:  L shoulder AROM WFL; R shoulder AROM limited especially IR (to waistline), flexion, (to 90) and abduction (to 75 deg) due to discomfort ; difficulty relaxing for accurate R shoulder PROM " assessment  STRENGTH: limited by R shoulder pain in ability to accurately assess; also difficulty assuming proper testing positions so formal strength testing deferred today; noted increased activation of UT  B elbow flexion and extension symmetrical and WNL 5/5  FLEXIBILITY:  Decreased UT, LS, and pec on R>L sides  PALPATION:  TTP UT, along R bicep distribution, between B scapular medial boarders  JOINT MOBILITY: WFL  CERVICAL SCREEN: WFL    Assessment & Plan   CLINICAL IMPRESSIONS  Medical Diagnosis: Right shoulder pain    Treatment Diagnosis: Right shoulder pain   Impression/Assessment: Patient is a 68 year old female with R shoulder pain and limited mobility complaints.  The following significant findings have been identified: Pain, Decreased ROM/flexibility, Decreased joint mobility, Decreased strength, Impaired muscle performance, Decreased activity tolerance, and Impaired posture. These impairments interfere with their ability to perform self care tasks, recreational activities, household chores, driving , household mobility, and community mobility as compared to previous level of function.     Clinical Decision Making (Complexity):  Clinical Presentation: Stable/Uncomplicated  Clinical Presentation Rationale: based on medical and personal factors listed in PT evaluation  Personal Factors: Moderate  Clinical Decision Making (Complexity): Low complexity    PLAN OF CARE  Treatment Interventions:  Interventions: Gait Training, Manual Therapy, Neuromuscular Re-education, Therapeutic Activity, Therapeutic Exercise    Long Term Goals     PT Goal 1  Goal Identifier: HEP  Goal Description: Patient will be consistent and independent with HEP in order to achieve functional goals.  Rationale: to maximize safety and independence with performance of ADLs and functional tasks;to maximize safety and independence within the home;to maximize safety and independence within the community;to maximize safety and independence with  self cares  Target Date: 01/15/24  PT Goal 2  Goal Identifier: Shoulder AROM  Goal Description: Patient will tolerate R shoulder AROM comparable to L shoulder in order to return to PLOF and daily activites with decreased discomfort.  Rationale: to maximize safety and independence with performance of ADLs and functional tasks;to maximize safety and independence with self cares  Target Date: 02/07/24  PT Goal 3  Goal Identifier: Sleep  Goal Description: Patient will report improved sleep quality as measured by decreased number of times waking at night and waking with decreased R arm discomfort in order to improve the quality of her sleep.  Rationale: to maximize safety and independence with performance of ADLs and functional tasks;to maximize safety and independence with self cares  Target Date: 02/07/24      Frequency of Treatment: 1x per week  Duration of Treatment: 8 weeks    Recommended Referrals to Other Professionals:   Education Assessment:   Learner/Method: Patient;Listening;Pictures/Video  Education Comments: questions answered, discussed PT POC, personal factors and barriers discussed and identifies    Risks and benefits of evaluation/treatment have been explained.   Patient/Family/caregiver agrees with Plan of Care.     Evaluation Time:     PT Eval, Low Complexity Minutes (75818): 32     Signing Clinician: Mirna Casper, PT      [facility]                                                                                   OUTPATIENT PHYSICAL THERAPY      PLAN OF TREATMENT FOR OUTPATIENT REHABILITATION   Patient's Last Name, First Name, Mary Jane Gaspar YOB: 1955   Provider's Name   [facility]   Medical Record No.  [id number]     Onset Date: 12/11/23 (order date)  Start of Care Date: 12/13/23     Medical Diagnosis:  Right shoulder pain      PT Treatment Diagnosis:  Right shoulder pain Plan of Treatment  Frequency/Duration: 1x  per week/ 8 weeks    Certification date from 12/13/23 to 02/07/24         See note for plan of treatment details and functional goals     Mirna Casper, PT                         I CERTIFY THE NEED FOR THESE SERVICES FURNISHED UNDER        THIS PLAN OF TREATMENT AND WHILE UNDER MY CARE     (Physician attestation of this document indicates review and certification of the therapy plan).              Referring Provider:  Diamond Huitron    Initial Assessment  See Epic Evaluation- Start of Care Date: 12/13/23

## 2024-01-02 ENCOUNTER — OFFICE VISIT (OUTPATIENT)
Dept: URGENT CARE | Facility: URGENT CARE | Age: 69
End: 2024-01-02
Payer: COMMERCIAL

## 2024-01-02 VITALS
TEMPERATURE: 99.2 F | DIASTOLIC BLOOD PRESSURE: 84 MMHG | RESPIRATION RATE: 18 BRPM | HEART RATE: 109 BPM | OXYGEN SATURATION: 99 % | SYSTOLIC BLOOD PRESSURE: 126 MMHG

## 2024-01-02 DIAGNOSIS — H10.32 ACUTE BACTERIAL CONJUNCTIVITIS OF LEFT EYE: Primary | ICD-10-CM

## 2024-01-02 PROCEDURE — 99213 OFFICE O/P EST LOW 20 MIN: CPT | Performed by: PHYSICIAN ASSISTANT

## 2024-01-02 RX ORDER — TOBRAMYCIN AND DEXAMETHASONE 3; 1 MG/ML; MG/ML
1-2 SUSPENSION/ DROPS OPHTHALMIC 4 TIMES DAILY
Qty: 5 ML | Refills: 0 | Status: SHIPPED | OUTPATIENT
Start: 2024-01-02 | End: 2024-01-09

## 2024-01-02 NOTE — PROGRESS NOTES
Assessment & Plan     Acute bacterial conjunctivitis of left eye  Tobradex eyedrops are prescribed.  Good handwashing is advised.  Follow-up if any worsening symptoms.  Patient understands and agrees with the plan.    - tobramycin-dexAMETHasone (TOBRADEX) 0.3-0.1 % ophthalmic suspension  Dispense: 5 mL; Refill: 0         Return in about 5 days (around 1/7/2024) for Symptoms failing to improve.    Lorrie Dewitt PA-C  [facility] HOA Hinton is a 68 year old female who presents to clinic today for the following health issues:  Chief Complaint   Patient presents with    Eye Problem     Pt reports beginning this morning pt felt as if something was in her L eye, green discharge     HPI    Patient is presenting to urgent care today with a complaint of left eye redness and drainage and gritty sensation.  Onset of symptoms since last night.  Worsening symptoms since this morning.  Denies any eye pain.  Treatment tried: None.      Review of Systems  Constitutional, HEENT, cardiovascular, pulmonary, GI, , musculoskeletal, neuro, skin, endocrine and psych systems are negative, except as otherwise noted.      Objective    /84 (BP Location: Right arm, Patient Position: Sitting, Cuff Size: Adult Regular)   Pulse 109   Temp 99.2  F (37.3  C) (Tympanic)   Resp 18   LMP 08/07/2008   SpO2 99%   Physical Exam   GENERAL: healthy, alert and no distress  EYES: Right eye exam is normal, PERRL, EOM are normal, left eye exam: sclera conjunctiva are injected, thick drainage noted from the left eye, no tenderness to palpation over the orbital rim.  No facial erythema.

## 2024-01-03 ENCOUNTER — MYC MEDICAL ADVICE (OUTPATIENT)
Dept: FAMILY MEDICINE | Facility: CLINIC | Age: 69
End: 2024-01-03
Payer: COMMERCIAL

## 2024-01-09 ENCOUNTER — THERAPY VISIT (OUTPATIENT)
Dept: PHYSICAL THERAPY | Facility: CLINIC | Age: 69
End: 2024-01-09
Payer: COMMERCIAL

## 2024-01-09 DIAGNOSIS — M25.511 RIGHT SHOULDER PAIN, UNSPECIFIED CHRONICITY: Primary | ICD-10-CM

## 2024-01-09 PROCEDURE — 97110 THERAPEUTIC EXERCISES: CPT | Mod: GP | Performed by: PHYSICAL THERAPIST

## 2024-01-16 ENCOUNTER — THERAPY VISIT (OUTPATIENT)
Dept: PHYSICAL THERAPY | Facility: CLINIC | Age: 69
End: 2024-01-16
Payer: COMMERCIAL

## 2024-01-16 DIAGNOSIS — M25.511 RIGHT SHOULDER PAIN, UNSPECIFIED CHRONICITY: Primary | ICD-10-CM

## 2024-01-16 PROCEDURE — 97140 MANUAL THERAPY 1/> REGIONS: CPT | Mod: GP | Performed by: PHYSICAL THERAPIST

## 2024-01-16 PROCEDURE — 97110 THERAPEUTIC EXERCISES: CPT | Mod: GP | Performed by: PHYSICAL THERAPIST

## 2024-01-23 ENCOUNTER — THERAPY VISIT (OUTPATIENT)
Dept: PHYSICAL THERAPY | Facility: CLINIC | Age: 69
End: 2024-01-23
Payer: COMMERCIAL

## 2024-01-23 DIAGNOSIS — M25.511 RIGHT SHOULDER PAIN, UNSPECIFIED CHRONICITY: Primary | ICD-10-CM

## 2024-01-23 PROCEDURE — 97112 NEUROMUSCULAR REEDUCATION: CPT | Mod: GP | Performed by: PHYSICAL THERAPIST

## 2024-01-23 PROCEDURE — 97110 THERAPEUTIC EXERCISES: CPT | Mod: GP | Performed by: PHYSICAL THERAPIST

## 2024-01-30 ENCOUNTER — THERAPY VISIT (OUTPATIENT)
Dept: PHYSICAL THERAPY | Facility: CLINIC | Age: 69
End: 2024-01-30
Payer: COMMERCIAL

## 2024-01-30 DIAGNOSIS — M25.511 RIGHT SHOULDER PAIN, UNSPECIFIED CHRONICITY: Primary | ICD-10-CM

## 2024-01-30 DIAGNOSIS — M25.511 ACUTE PAIN OF RIGHT SHOULDER: Primary | ICD-10-CM

## 2024-01-30 PROCEDURE — 97112 NEUROMUSCULAR REEDUCATION: CPT | Mod: GP | Performed by: PHYSICAL THERAPIST

## 2024-01-30 PROCEDURE — 97110 THERAPEUTIC EXERCISES: CPT | Mod: GP | Performed by: PHYSICAL THERAPIST

## 2024-01-30 NOTE — PROGRESS NOTES
Contacted by PT.     Has participated in physical therapy without improvement in her overnight symptoms.     MRI right shoulder ordered.

## 2024-02-06 ENCOUNTER — TELEPHONE (OUTPATIENT)
Dept: FAMILY MEDICINE | Facility: CLINIC | Age: 69
End: 2024-02-06
Payer: COMMERCIAL

## 2024-02-06 NOTE — TELEPHONE ENCOUNTER
[facility] calling for MRI order    MRI of right shoulder ordered on 1/30 by Dr. Ford    Faxed to [phone]    LEIDY HANLEY RN on 2/6/2024 at 9:29 AM   [facility]

## 2024-02-07 ENCOUNTER — TRANSFERRED RECORDS (OUTPATIENT)
Dept: HEALTH INFORMATION MANAGEMENT | Facility: CLINIC | Age: 69
End: 2024-02-07

## 2024-02-09 ENCOUNTER — THERAPY VISIT (OUTPATIENT)
Dept: PHYSICAL THERAPY | Facility: CLINIC | Age: 69
End: 2024-02-09
Payer: COMMERCIAL

## 2024-02-09 DIAGNOSIS — M25.511 RIGHT SHOULDER PAIN, UNSPECIFIED CHRONICITY: Primary | ICD-10-CM

## 2024-02-09 PROCEDURE — 97110 THERAPEUTIC EXERCISES: CPT | Mod: GP | Performed by: PHYSICAL THERAPIST

## 2024-02-09 PROCEDURE — 97112 NEUROMUSCULAR REEDUCATION: CPT | Mod: GP | Performed by: PHYSICAL THERAPIST

## 2024-02-13 NOTE — PROGRESS NOTES
DISCHARGE  Reason for Discharge: Change in medical status.      Discharge Plan: Will resume PT under new POC after patient returns from vacation and has R shoulder injection.    Referring Provider:  Diamond Huitron    PT PROGRESS NOTE  PLAN  Consult with ortho regarding MRI results. Continue with updated HEP for pain management and to continue R shoulder motion WFL as tolerated. See details below.    Beginning/End Dates of Progress Note Reporting Period:  12/13/24 to 02/09/2024 02/09/24 0500   PT Goal 1   Goal Identifier HEP   Goal Description Patient will be consistent and independent with HEP in order to achieve functional goals.   Rationale to maximize safety and independence with performance of ADLs and functional tasks;to maximize safety and independence within the home;to maximize safety and independence within the community;to maximize safety and independence with self cares   Goal Progress 1/9/23: progressing 1/23/24: met, continues consistently   Target Date 01/15/24   Date Met 01/23/24   PT Goal 2   Goal Identifier Shoulder AROM   Goal Description Patient will tolerate R shoulder AROM comparable to L shoulder in order to return to PLOF and daily activites with decreased discomfort.   Rationale to maximize safety and independence with performance of ADLs and functional tasks;to maximize safety and independence with self cares   Goal Progress 1/9/24: progressing 1/30/24: remains limited by discomfort in AROM, able to tolerate WFL PROM for R shoulder motion 2/9/24: remains limited in R shoulder AROM flexion, abduction, IR   Target Date 04/04/24   PT Goal 3   Goal Identifier Sleep   Goal Description Patient will report improved sleep quality as measured by decreased number of times waking at night and waking with decreased R arm discomfort in order to improve the quality of her sleep.   Rationale to maximize safety and independence with performance of ADLs and functional tasks;to maximize safety and  independence with self cares   Goal Progress 1/9/24: progressing, notes only 1 night this week with discomfort while sleeping 1/30/24: regression noted in sleep quality with increased symptoms at night- pt notes last night she had trouble falling asleep but also woke up 5x in the night due to deep aching R shoulder pain 2/9/24: continues to have discomfort while sleeping- primary remaining complaint   Target Date 04/04/24   Subjective Report   Subjective Report States that she continues to have discomfort at night. Had MRI results: see details in comments section. Is leaving for [place]   Objective Measure 1   Objective Measure R Shoulder AROM   Details AROM to 80 deg then AAROM to 110; tolerates abduction to 83   Therapeutic Procedure/Exercise   Therapeutic Procedures: strength, endurance, ROM, flexibillity minutes (53906) 23   PTRx Ther Proc 3 Wall Climb against doorway   PTRx Ther Proc 3 - Details to complete withing tolerable limits   PTRx Ther Proc 5 Scapular Retraction and Depression Rows   PTRx Ther Proc 5 - Details RTB for upright posture, scapular strength, and shoulder mechanics against resistance   PTRx Ther Proc 6 Shoulder Extension with Band   PTRx Ther Proc 6 - Details RTB for upright posture and posterior arm strengthening 2x10-20 as tolerated   PTRx Ther Proc 7 Shoulder IR assisted with Wand   PTRx Ther Proc 7 - Details 2x10 continue HEP   PTRx Ther Proc 8 Wand Shoulder Flexion Supine   PTRx Ther Proc 8 - Details HEP 2x10   Skilled Intervention HEP clarification, cues, modifications and monitoring of symptoms as needed   Patient Response/Progress all exercises for scapular strengthening and to continue maintain R shoulder motion WFL   Neuromuscular Re-education   Neuromuscular re-ed of mvmt, balance, coord, kinesthetic sense, posture, proprioception minutes (47754) 10   Neuro Re-ed 1 Wall Walks   Neuro Re-ed 1 - Details complted for scapular stability activity   Neuro Re-ed 2 Wall Clocks   Neuro Re-ed 2 -  Details difficulty with RTB   Skilled Intervention reviewed, recommendations if modifications needed continue with YTB   Patient Response/Progress pt to complete within tolerable range without symptom increase   Education   Learner/Method Patient;Listening;Reading;Pictures/Video   Education Comments review the MRI results, recommendations to continue movement within her tolerance, recommendation for referral to ortho; modifications to HEP   Plan   Home program [facility] HEP updated with printed copy   Updates to plan of care recommending ortho referral; follow back with PT for home maintence after she returns from multiweek trip to [place]   Plan for next session recommending ortho consult     Goals: partially met, pt continues to have functional limitations and discomfort primarily when sleeping.        [facility]                                                                                   OUTPATIENT PHYSICAL THERAPY    PLAN OF TREATMENT FOR OUTPATIENT REHABILITATION   Patient's Last Name, First Name, Mary Jane Gaspar  AYAZ YOB: 1955   Provider's Name   [facility]   Medical Record No.  [id number]     Onset Date: 12/11/23 (order date)  Start of Care Date: 12/13/23     Medical Diagnosis:  Right shoulder pain      PT Treatment Diagnosis:  Right shoulder pain Plan of Treatment  Frequency/Duration: 1x per week/ 8 weeks    Certification date from 02/08/24 to 04/04/24         See note for plan of treatment details and functional goals     Mirna Casper, PT                         I CERTIFY THE NEED FOR THESE SERVICES FURNISHED UNDER        THIS PLAN OF TREATMENT AND WHILE UNDER MY CARE     (Physician attestation of this document indicates review and certification of the therapy plan).              Primary Care Provider: Dr. Laura Ford    Initial Assessment  See Epic Evaluation- Start of Care Date: 12/13/23

## 2024-02-15 NOTE — PROGRESS NOTES
Unable to reach patient .     Team     Please inform patient I have reviewed her MRI Scan .  As there is a rotator cuff tear and tendonitis . As well as small tear   Recommend orthopedics consultation .   Please check if she is okay if I place [place] ortho consultation .     Thanks   Diamond Huitron MD.

## 2024-02-26 ENCOUNTER — MYC MEDICAL ADVICE (OUTPATIENT)
Dept: FAMILY MEDICINE | Facility: CLINIC | Age: 69
End: 2024-02-26
Payer: COMMERCIAL

## 2024-02-26 DIAGNOSIS — S43.431A TEAR OF RIGHT GLENOID LABRUM, INITIAL ENCOUNTER: ICD-10-CM

## 2024-02-26 DIAGNOSIS — M75.111 INCOMPLETE ROTATOR CUFF TEAR OR RUPTURE OF RIGHT SHOULDER, NOT SPECIFIED AS TRAUMATIC: Primary | ICD-10-CM

## 2024-03-01 ENCOUNTER — OFFICE VISIT (OUTPATIENT)
Dept: URGENT CARE | Facility: URGENT CARE | Age: 69
End: 2024-03-01
Payer: COMMERCIAL

## 2024-03-01 VITALS
DIASTOLIC BLOOD PRESSURE: 78 MMHG | HEART RATE: 110 BPM | SYSTOLIC BLOOD PRESSURE: 122 MMHG | RESPIRATION RATE: 18 BRPM | TEMPERATURE: 99.1 F | OXYGEN SATURATION: 98 %

## 2024-03-01 DIAGNOSIS — J06.9 UPPER RESPIRATORY TRACT INFECTION, UNSPECIFIED TYPE: Primary | ICD-10-CM

## 2024-03-01 DIAGNOSIS — Z85.42 HISTORY OF ENDOMETRIAL CANCER: ICD-10-CM

## 2024-03-01 DIAGNOSIS — R05.8 PRODUCTIVE COUGH: ICD-10-CM

## 2024-03-01 LAB
FLUAV AG SPEC QL IA: NEGATIVE
FLUBV AG SPEC QL IA: NEGATIVE

## 2024-03-01 PROCEDURE — 87804 INFLUENZA ASSAY W/OPTIC: CPT | Performed by: FAMILY MEDICINE

## 2024-03-01 PROCEDURE — 87635 SARS-COV-2 COVID-19 AMP PRB: CPT | Performed by: FAMILY MEDICINE

## 2024-03-01 PROCEDURE — 99214 OFFICE O/P EST MOD 30 MIN: CPT | Performed by: FAMILY MEDICINE

## 2024-03-01 RX ORDER — DOXYCYCLINE 100 MG/1
100 TABLET ORAL 2 TIMES DAILY
Qty: 14 TABLET | Refills: 0 | Status: SHIPPED | OUTPATIENT
Start: 2024-03-01 | End: 2024-03-08

## 2024-03-01 NOTE — PROGRESS NOTES
"SUBJECTIVE: Mary Jane Matta is a 68 year old female presenting with a chief complaint of \"cold symptoms\".  Onset of symptoms was 1 day(s) ago.  Predisposing factors include ill 2 weeks ago.    Past Medical History:   Diagnosis Date    Endometrial cancer (H) 09/2008    Stage I B    CATIA BSO    Urinary incontinence      No Known Allergies  Social History     Tobacco Use    Smoking status: Never    Smokeless tobacco: Never   Substance Use Topics    Alcohol use: Yes     Comment: socially (3 drinks per week)       ROS:  SKIN: no rash  GI: no vomiting    OBJECTIVE:  /78   Pulse 110   Temp 99.1  F (37.3  C) (Tympanic)   Resp 18   LMP 08/07/2008   SpO2 98% GENERAL APPEARANCE: healthy, alert and no distress  EYES: EOMI,  PERRL, conjunctiva clear  HENT: ear canals and TM's normal.  Nose and mouth without ulcers, erythema or lesions  RESP: lungs clear to auscultation - no rales, rhonchi or wheezes  SKIN: no suspicious lesions or rashes      ICD-10-CM    1. Upper respiratory tract infection, unspecified type  J06.9 Influenza A & B Antigen - Clinic Collect     Symptomatic COVID-19 Virus (Coronavirus) by PCR     Symptomatic COVID-19 Virus (Coronavirus) by PCR Nose      2. History of endometrial cancer  Z85.42       3. Productive cough  R05.8 doxycycline monohydrate (ADOXA) 100 MG tablet        Fluids/Rest, f/u if worse/not any better    "

## 2024-03-02 LAB — SARS-COV-2 RNA RESP QL NAA+PROBE: NEGATIVE

## 2024-03-18 ENCOUNTER — ANCILLARY PROCEDURE (OUTPATIENT)
Dept: GENERAL RADIOLOGY | Facility: CLINIC | Age: 69
End: 2024-03-18
Attending: STUDENT IN AN ORGANIZED HEALTH CARE EDUCATION/TRAINING PROGRAM
Payer: COMMERCIAL

## 2024-03-18 ENCOUNTER — OFFICE VISIT (OUTPATIENT)
Dept: ORTHOPEDICS | Facility: CLINIC | Age: 69
End: 2024-03-18
Attending: FAMILY MEDICINE
Payer: COMMERCIAL

## 2024-03-18 VITALS — BODY MASS INDEX: 28.55 KG/M2 | HEIGHT: 68 IN

## 2024-03-18 DIAGNOSIS — S43.431A TEAR OF RIGHT GLENOID LABRUM, INITIAL ENCOUNTER: ICD-10-CM

## 2024-03-18 DIAGNOSIS — M75.111 INCOMPLETE ROTATOR CUFF TEAR OR RUPTURE OF RIGHT SHOULDER, NOT SPECIFIED AS TRAUMATIC: ICD-10-CM

## 2024-03-18 PROCEDURE — 99204 OFFICE O/P NEW MOD 45 MIN: CPT | Performed by: STUDENT IN AN ORGANIZED HEALTH CARE EDUCATION/TRAINING PROGRAM

## 2024-03-18 PROCEDURE — 73030 X-RAY EXAM OF SHOULDER: CPT | Mod: TC | Performed by: RADIOLOGY

## 2024-03-18 NOTE — PROGRESS NOTES
CC: Right shoulder stiffness and pain    HPI: Patient is a 68-year-old female who presents here today with right shoulder pain and stiffness.  This has been going on for approximately 1 year.  She believes this started when she reached into her backseat of her car.  Since that time she has noted progressive decreased range of motion of the right shoulder and global pain.  She localizes the pain to the anterior, lateral, posterior, and axillary regions of her shoulder.  She denies any pain in the neck or any significant weakness.  She has pain when attempting to raise her arm above her head.  She also has pain at night when she rolls over onto her arm.  She takes Tylenol as needed for the pain.  She has done a 6-week course of physical therapy here in [place].  She feels that this helped her range of motion slightly, but she continued to have pain.  Since she has stopped her physical therapy, her range of motion has been worsening.  She has never had an injection to her shoulder.  She has never had any prior injury to the shoulder.  She has never had any prior surgery of the shoulder.  She did have a previous diagnosis of adhesive capsulitis in the left shoulder that resolved with a course of physical therapy    She is right-hand dominant.  She is otherwise healthy.  She has a history of uterine cancer status post hysterectomy.  She is a retired  from [employer].  She does not smoke.  She enjoys working out lifetime and would like to get back to this         Patient Active Problem List   Diagnosis    Herpes labialis    Osteopenia    Advanced directives, counseling/discussion    GERD (gastroesophageal reflux disease)    History of endometrial cancer          Past Medical History:   Diagnosis Date    Endometrial cancer (H) 09/2008    Stage I B    [facility] BSO    Urinary incontinence           Past Surgical History:   Procedure Laterality Date    CHOLECYSTECTOMY  5/2015    HC TOOTH EXTRACTION W/FORCEP       SURGICAL HISTORY OF -       3      TONSILLECTOMY      ZZC TOTAL ABDOM HYSTERECTOMY      Dr Len BARDALES BSO staging for Stage 1 B endometrial Ca          Current Outpatient Medications   Medication Sig Dispense Refill    cholecalciferol (VITAMIN D3) 5000 units (125 mcg) capsule Take by mouth daily      cyclobenzaprine (FLEXERIL) 5 MG tablet Take 1 tablet (5 mg) by mouth nightly as needed for muscle spasms 30 tablet 0    MAGNESIUM GLYCINATE PLUS PO       omeprazole (PRILOSEC) 40 MG DR capsule Take 1 capsule (40 mg) by mouth daily 90 capsule 1    triamcinolone (KENALOG) 0.1 % external cream Apply topically 2 times daily 30 g 0    valACYclovir (VALTREX) 500 MG tablet Take 1 tablet by mouth 2 times daily          No Known Allergies       Family History   Problem Relation Age of Onset    Circulatory Father          at an early age of annurysm-brain- at 45    Alcohol/Drug Father     Heart Disease Maternal Grandmother         UNKNWON DETAILS    Hypertension Maternal Grandmother     Cerebrovascular Disease Maternal Grandmother     Diabetes Paternal Grandmother     Hypertension Paternal Grandmother     Cerebrovascular Disease Paternal Grandmother     Family History Negative Mother         alive 81 yo old    Osteoporosis Mother     Family History Negative Maternal Grandfather          in accident    Family History Negative Paternal Grandfather     Family History Negative Brother         alive 58 , smoker    Alcohol/Drug Brother         one bother only     Heart Disease Brother     Breast Cancer No family hx of     Cancer - colorectal No family hx of           Social History     Tobacco Use    Smoking status: Never    Smokeless tobacco: Never   Substance Use Topics    Alcohol use: Yes     Comment: socially (3 drinks per week)            Objective:  Physical Exam:  RUE: No gross deformity of the shoulder.  No open wounds or lacerations.  No surgical incisions.  No erythema or ecchymosis.  No pain to  palpation over the clavicle, AC joint, acromion, or scapular spine.  Mild pain to palpation over the posterior joint line, lateral aspect of the shoulder, and long head of the biceps in the bicipital groove.  Passive range of motion 110 degrees forward flexion, 90 degrees abduction, 45 degrees external rotation, BL internal rotation.  Active range of motion is equivalent to passive range of motion.  5/5 strength in flexion, abduction, internal and external rotation below shoulder level.  Sensation intact to axillary, radial, median, and ulnar nerve distribution.    Imagin view x-ray of the right shoulder from today was reviewed.  This shows no fracture or acute bony pathology.  Well-maintained glenohumeral joint space.  No high riding humeral head.  No significant AC joint arthrosis noted    MRI without contrast of the right shoulder from outside facility on 2024 was reviewed.  This shows a high-grade partial-thickness tear of the leading edge of the supraspinatus.  No areas of complete rotator cuff tear or retraction.  Leading edge subscapularis tendinosis versus partial thickness tear.  Well-maintained muscle bulk of the rotator cuff tendons without fatty infiltration there is slight chondral wear from the anterior inferior aspect of the glenoid with associated labral fraying.    Assessment and Plan: Patient is a 68-year-old female who presents here today with 1 year of right shoulder pain and stiffness.  On MRI she has noted to have a high-grade partial-thickness tear of the leading edge of the supraspinatus with a small amount of glenohumeral chondromalacia.  However by exam, she is still dealing with adhesive capsulitis.  I definitely review her imaging with her today.  I discussed both her diagnoses with her.  I discussed with her that partial-thickness rotator cuff tears can cause significant pain over the lateral aspect of the shoulder with overhead activities and lifting objects away from  her body.  First-line treatment is physical therapy with range of motion and rotator cuff strengthening plus minus a subacromial corticosteroid injection.  If nonoperative management fails, the operative intervention would be an arthroscopic rotator cuff repair.  I discussed the surgical technique with her and postoperative protocol.  However, I would not recommend operative intervention in the setting of symptomatic adhesive capsulitis.  I discussed with her that given her current range of motion, it would be very difficult for her to complete her postoperative protocol if she was required to be in a sling for 4 to 6 weeks after rotator cuff repair.  I discussed her second diagnosis of adhesive capsulitis with her.  We discussed the natural progression of adhesive capsulitis.  I discussed with her that first-line treatment is physical therapy for range of motion.  This can take on the order of months to see results.  Additionally, an intra-articular corticosteroid injection with capsular stretching can be used to help kick start physical therapy and for pain relief.  Very rarely is a manipulation under anesthesia with lysis of adhesions required.  At this time point, I do think that her partial-thickness rotator cuff tear may be contributing to the cycle of her being unable to completely resolve her adhesive capsulitis.  However, I would need to see better preoperative range of motion before offering an arthroscopic rotator cuff repair.  She is understanding of this.  She would like to move forward with an ultrasound-guided glenohumeral injection with my sports medicine colleagues.  I will place a referral for this.  We will also then coordinate an immediate physical therapy session for shoulder range of motion with a diagnosis of adhesive capsulitis.  I will also prescribe her physical therapy once a week for 6 weeks for active and passive range of motion.  I recommend no rotator cuff strengthening until full  range of motion has been achieved.  I will follow-up with her myself in 2 months to evaluate her progress, I discussed potential need for future operative intervention.    José Martinez MD    [facility]   Department of Orthopedic Surgery    Disclaimer: This note consists of symbols derived from keyboarding, dictation and/or voice recognition software. As a result, there may be errors in the script that have gone undetected. Please consider this when interpreting information found in this chart.

## 2024-03-18 NOTE — PATIENT INSTRUCTIONS
[facility]   [address] [place] [address], [facility] [address]  [place] [address] [address] [place] [address]   Appointments: [phone] Appointments: [phone]   Fax: [phone] Fax: [phone]       1. Incomplete rotator cuff tear or rupture of right shoulder, not specified as traumatic    2. Tear of right glenoid labrum, initial encounter        Follow up with Dr. Martinez in 2 months .    Call my office with any questions or concerns, [phone].

## 2024-03-18 NOTE — LETTER
3/18/2024         RE: Mary Jane Matta  [address]  [place] [address]        Dear Colleague,    Thank you for referring your patient, Mary Jane Matta, to the [facility] [place]. Please see a copy of my visit note below.    CC: Right shoulder stiffness and pain    HPI: Patient is a 68-year-old female who presents here today with right shoulder pain and stiffness.  This has been going on for approximately 1 year.  She believes this started when she reached into her backseat of her car.  Since that time she has noted progressive decreased range of motion of the right shoulder and global pain.  She localizes the pain to the anterior, lateral, posterior, and axillary regions of her shoulder.  She denies any pain in the neck or any significant weakness.  She has pain when attempting to raise her arm above her head.  She also has pain at night when she rolls over onto her arm.  She takes Tylenol as needed for the pain.  She has done a 6-week course of physical therapy here in [place].  She feels that this helped her range of motion slightly, but she continued to have pain.  Since she has stopped her physical therapy, her range of motion has been worsening.  She has never had an injection to her shoulder.  She has never had any prior injury to the shoulder.  She has never had any prior surgery of the shoulder.  She did have a previous diagnosis of adhesive capsulitis in the left shoulder that resolved with a course of physical therapy    She is right-hand dominant.  She is otherwise healthy.  She has a history of uterine cancer status post hysterectomy.  She is a retired  from [employer].  She does not smoke.  She enjoys working out lifetime and would like to get back to this         Patient Active Problem List   Diagnosis     Herpes labialis     Osteopenia     Advanced directives, counseling/discussion     GERD (gastroesophageal reflux disease)      History of endometrial cancer          Past Medical History:   Diagnosis Date     Endometrial cancer (H) 2008    Stage I B    [facility] BSO     Urinary incontinence           Past Surgical History:   Procedure Laterality Date     CHOLECYSTECTOMY  2015     HC TOOTH EXTRACTION W/FORCEP       SURGICAL HISTORY OF -       3       TONSILLECTOMY       ZZC TOTAL ABDOM HYSTERECTOMY      Dr Len CASEYH BSO staging for Stage 1 B endometrial Ca          Current Outpatient Medications   Medication Sig Dispense Refill     cholecalciferol (VITAMIN D3) 5000 units (125 mcg) capsule Take by mouth daily       cyclobenzaprine (FLEXERIL) 5 MG tablet Take 1 tablet (5 mg) by mouth nightly as needed for muscle spasms 30 tablet 0     MAGNESIUM GLYCINATE PLUS PO        omeprazole (PRILOSEC) 40 MG DR capsule Take 1 capsule (40 mg) by mouth daily 90 capsule 1     triamcinolone (KENALOG) 0.1 % external cream Apply topically 2 times daily 30 g 0     valACYclovir (VALTREX) 500 MG tablet Take 1 tablet by mouth 2 times daily          No Known Allergies       Family History   Problem Relation Age of Onset     Circulatory Father          at an early age of annurysm-brain- at 45     Alcohol/Drug Father      Heart Disease Maternal Grandmother         UNKNWON DETAILS     Hypertension Maternal Grandmother      Cerebrovascular Disease Maternal Grandmother      Diabetes Paternal Grandmother      Hypertension Paternal Grandmother      Cerebrovascular Disease Paternal Grandmother      Family History Negative Mother         alive 79 yo old     Osteoporosis Mother      Family History Negative Maternal Grandfather          in accident     Family History Negative Paternal Grandfather      Family History Negative Brother         alive 58 , smoker     Alcohol/Drug Brother         one bother only      Heart Disease Brother      Breast Cancer No family hx of      Cancer - colorectal No family hx of           Social History     Tobacco Use      Smoking status: Never     Smokeless tobacco: Never   Substance Use Topics     Alcohol use: Yes     Comment: socially (3 drinks per week)            Objective:  Physical Exam:  RUE: No gross deformity of the shoulder.  No open wounds or lacerations.  No surgical incisions.  No erythema or ecchymosis.  No pain to palpation over the clavicle, AC joint, acromion, or scapular spine.  Mild pain to palpation over the posterior joint line, lateral aspect of the shoulder, and long head of the biceps in the bicipital groove.  Passive range of motion 110 degrees forward flexion, 90 degrees abduction, 45 degrees external rotation, BL internal rotation.  Active range of motion is equivalent to passive range of motion.  5/5 strength in flexion, abduction, internal and external rotation below shoulder level.  Sensation intact to axillary, radial, median, and ulnar nerve distribution.    Imagin view x-ray of the right shoulder from today was reviewed.  This shows no fracture or acute bony pathology.  Well-maintained glenohumeral joint space.  No high riding humeral head.  No significant AC joint arthrosis noted    MRI without contrast of the right shoulder from outside facility on 2024 was reviewed.  This shows a high-grade partial-thickness tear of the leading edge of the supraspinatus.  No areas of complete rotator cuff tear or retraction.  Leading edge subscapularis tendinosis versus partial thickness tear.  Well-maintained muscle bulk of the rotator cuff tendons without fatty infiltration there is slight chondral wear from the anterior inferior aspect of the glenoid with associated labral fraying.    Assessment and Plan: Patient is a 68-year-old female who presents here today with 1 year of right shoulder pain and stiffness.  On MRI she has noted to have a high-grade partial-thickness tear of the leading edge of the supraspinatus with a small amount of glenohumeral chondromalacia.  However by exam, she is still  dealing with adhesive capsulitis.  I definitely review her imaging with her today.  I discussed both her diagnoses with her.  I discussed with her that partial-thickness rotator cuff tears can cause significant pain over the lateral aspect of the shoulder with overhead activities and lifting objects away from her body.  First-line treatment is physical therapy with range of motion and rotator cuff strengthening plus minus a subacromial corticosteroid injection.  If nonoperative management fails, the operative intervention would be an arthroscopic rotator cuff repair.  I discussed the surgical technique with her and postoperative protocol.  However, I would not recommend operative intervention in the setting of symptomatic adhesive capsulitis.  I discussed with her that given her current range of motion, it would be very difficult for her to complete her postoperative protocol if she was required to be in a sling for 4 to 6 weeks after rotator cuff repair.  I discussed her second diagnosis of adhesive capsulitis with her.  We discussed the natural progression of adhesive capsulitis.  I discussed with her that first-line treatment is physical therapy for range of motion.  This can take on the order of months to see results.  Additionally, an intra-articular corticosteroid injection with capsular stretching can be used to help kick start physical therapy and for pain relief.  Very rarely is a manipulation under anesthesia with lysis of adhesions required.  At this time point, I do think that her partial-thickness rotator cuff tear may be contributing to the cycle of her being unable to completely resolve her adhesive capsulitis.  However, I would need to see better preoperative range of motion before offering an arthroscopic rotator cuff repair.  She is understanding of this.  She would like to move forward with an ultrasound-guided glenohumeral injection with my sports medicine colleagues.  I will place a referral for  this.  We will also then coordinate an immediate physical therapy session for shoulder range of motion with a diagnosis of adhesive capsulitis.  I will also prescribe her physical therapy once a week for 6 weeks for active and passive range of motion.  I recommend no rotator cuff strengthening until full range of motion has been achieved.  I will follow-up with her myself in 2 months to evaluate her progress, I discussed potential need for future operative intervention.    José Martinez MD    [facility]   Department of Orthopedic Surgery    Disclaimer: This note consists of symbols derived from keyboarding, dictation and/or voice recognition software. As a result, there may be errors in the script that have gone undetected. Please consider this when interpreting information found in this chart.         Again, thank you for allowing me to participate in the care of your patient.        Sincerely,        José Martinez MD

## 2024-04-03 NOTE — PROGRESS NOTES
Sports Medicine Procedure Note    Mary Jane was seen today for pain.    Diagnoses and all orders for this visit:    Incomplete rotator cuff tear or rupture of right shoulder, not specified as traumatic  -     Large Joint Injection/Arthocentesis: R glenohumeral joint    Adhesive capsulitis of right shoulder  -     Large Joint Injection/Arthocentesis: R glenohumeral joint        Mary Jane Matta is a/an 68 year old female who is seen for Corticosteroid injection of right glenohumeral joint.   Last injection: None previously the shoulder    -capsular distension and CSI today.   -Had improvement in pain and range of motion status postinjection.  Was to go to PT to do light range of motion after injection.  Warned of worsening symptoms after PT once numbness wore off.  -Post-injection instructions were provided to the patient  -Return sooner if develops new or worsening symptoms.  -Cortisone injection today.. can repeat every 3-4 months as needed. Discussed risks. Consented. Tolerated well. See procedure note.      Options for treatment and/or follow-up care were reviewed with the patient was actively involved in the decision making process. Patient verbalized understanding and was in agreement with the plan.    Agreeable to CSI injection injection. Discussed risks and benefits. they are aware of risks such as skin hypopigmentation, hyperglycemia, bleeding, and infection.         Dulce Castillo [facility] [place]        Post-Injection Discharge Instructions    You may shower, however avoid swimming, tub baths or hot tubs for 24 hours following your procedure  You may have a mild to moderate increase in pain for a few days following the injection.  The lidocaine (local numbing medicine) will wear off in several hours. It usually takes 3-5 days for the steroid medication to start working although it may take up to 14 days for full effect.   You may use ice packs for 10-15 minutes, 3 to 4  times a day at the injection site for comfort if needed  You may use extra strength Tylenol for pain control if necessary   If you were fasting, you may resume your normal diet and medications after the procedure  If you have diabetes, your blood sugar may be higher than normal for 10-14 days following a steroid injection. Contact your doctor who manages your diabetes if your blood sugar is significantly higher than usual    If you experience any of the following, call Sports Medicine @ [phone] or [phone]  -Fever over 100 degrees F  -Swelling, bleeding, redness, drainage, warmth at the injection site  -New or significant worsening pain        Dr. Dulce Castillo DO  [facility]  Sports Medicine     Large Joint Injection/Arthocentesis: R glenohumeral joint    Date/Time: 4/10/2024 9:33 AM    Performed by: Dulce Castillo DO  Authorized by: Dulce Castillo DO    Indications:  Pain and osteoarthritis  Needle Size:  22 G  Guidance: ultrasound    Approach:  Posterior  Location:  Shoulder      Site:  R glenohumeral joint  Medications:  40 mg triamcinolone 40 MG/ML; 14 mL ROPivacaine 5 MG/ML; 3 mL lidocaine 1 %  Medications comment:  10mL of Bacteriostatic 0.9% Sodium Chloride administered into the joint  Outcome:  Tolerated well, no immediate complications  Procedure discussed: discussed risks, benefits, and alternatives    Consent Given by:  Patient  Timeout: timeout called immediately prior to procedure    Prep: patient was prepped and draped in usual sterile fashion     Ultrasound was used to ensure safe and accurate needle placement and injection. Ultrasound images of the procedure were permanently stored.

## 2024-04-04 ASSESSMENT — ACTIVITIES OF DAILY LIVING (ADL)
REACHING_FOR_SOMETHING_ON_A_HIGH_SHELF: 10
PUSHING_WITH_THE_INVOLVED_ARM: 3
AT_ITS_WORST?: 9
PUTTING_ON_A_SHIRT_THAT_BUTTONS_DOWN_THE_FRONT: 1
PLEASE_INDICATE_YOR_PRIMARY_REASON_FOR_REFERRAL_TO_THERAPY:: SHOULDER
PUTTING_ON_AN_UNDERSHIRT_OR_A_PULLOVER_SWEATER: 8
WASHING_YOUR_HAIR?: 6
TOUCHING_THE_BACK_OF_YOUR_NECK: 5
PUTTING_ON_YOUR_PANTS: 4
WHEN_LYING_ON_THE_INVOLVED_SIDE: 7

## 2024-04-10 ENCOUNTER — OFFICE VISIT (OUTPATIENT)
Dept: ORTHOPEDICS | Facility: CLINIC | Age: 69
End: 2024-04-10
Payer: COMMERCIAL

## 2024-04-10 ENCOUNTER — THERAPY VISIT (OUTPATIENT)
Dept: PHYSICAL THERAPY | Facility: CLINIC | Age: 69
End: 2024-04-10
Attending: STUDENT IN AN ORGANIZED HEALTH CARE EDUCATION/TRAINING PROGRAM
Payer: COMMERCIAL

## 2024-04-10 DIAGNOSIS — S43.431A TEAR OF RIGHT GLENOID LABRUM, INITIAL ENCOUNTER: ICD-10-CM

## 2024-04-10 DIAGNOSIS — M75.111 INCOMPLETE ROTATOR CUFF TEAR OR RUPTURE OF RIGHT SHOULDER, NOT SPECIFIED AS TRAUMATIC: Primary | ICD-10-CM

## 2024-04-10 DIAGNOSIS — M75.01 ADHESIVE CAPSULITIS OF RIGHT SHOULDER: ICD-10-CM

## 2024-04-10 DIAGNOSIS — M75.111 INCOMPLETE ROTATOR CUFF TEAR OR RUPTURE OF RIGHT SHOULDER, NOT SPECIFIED AS TRAUMATIC: ICD-10-CM

## 2024-04-10 PROCEDURE — 20611 DRAIN/INJ JOINT/BURSA W/US: CPT | Mod: RT | Performed by: STUDENT IN AN ORGANIZED HEALTH CARE EDUCATION/TRAINING PROGRAM

## 2024-04-10 PROCEDURE — 97161 PT EVAL LOW COMPLEX 20 MIN: CPT | Mod: GP | Performed by: PHYSICAL THERAPIST

## 2024-04-10 PROCEDURE — 97110 THERAPEUTIC EXERCISES: CPT | Mod: GP | Performed by: PHYSICAL THERAPIST

## 2024-04-10 PROCEDURE — 99207 PR DROP WITH A PROCEDURE: CPT | Performed by: STUDENT IN AN ORGANIZED HEALTH CARE EDUCATION/TRAINING PROGRAM

## 2024-04-10 RX ORDER — LIDOCAINE HYDROCHLORIDE 10 MG/ML
3 INJECTION, SOLUTION INFILTRATION; PERINEURAL
Status: DISCONTINUED | OUTPATIENT
Start: 2024-04-10 | End: 2024-08-08

## 2024-04-10 RX ORDER — ROPIVACAINE HYDROCHLORIDE 5 MG/ML
14 INJECTION, SOLUTION EPIDURAL; INFILTRATION; PERINEURAL
Status: DISCONTINUED | OUTPATIENT
Start: 2024-04-10 | End: 2024-08-08

## 2024-04-10 RX ORDER — TRIAMCINOLONE ACETONIDE 40 MG/ML
40 INJECTION, SUSPENSION INTRA-ARTICULAR; INTRAMUSCULAR
Status: DISCONTINUED | OUTPATIENT
Start: 2024-04-10 | End: 2024-08-08

## 2024-04-10 RX ADMIN — TRIAMCINOLONE ACETONIDE 40 MG: 40 INJECTION, SUSPENSION INTRA-ARTICULAR; INTRAMUSCULAR at 09:33

## 2024-04-10 RX ADMIN — LIDOCAINE HYDROCHLORIDE 3 ML: 10 INJECTION, SOLUTION INFILTRATION; PERINEURAL at 09:33

## 2024-04-10 RX ADMIN — ROPIVACAINE HYDROCHLORIDE 14 ML: 5 INJECTION, SOLUTION EPIDURAL; INFILTRATION; PERINEURAL at 09:33

## 2024-04-10 NOTE — LETTER
4/10/2024         RE: Mary Jane Matta  [address]  [place] [address]        Dear Colleague,    Thank you for referring your patient, Mary Jane Matta, to the [facility]. Please see a copy of my visit note below.    Sports Medicine Procedure Note    Mary Jane was seen today for pain.    Diagnoses and all orders for this visit:    Incomplete rotator cuff tear or rupture of right shoulder, not specified as traumatic  -     Large Joint Injection/Arthocentesis: R glenohumeral joint    Adhesive capsulitis of right shoulder  -     Large Joint Injection/Arthocentesis: R glenohumeral joint        Mary Jane Matta is a/an 68 year old female who is seen for Corticosteroid injection of right glenohumeral joint.   Last injection: None previously the shoulder    -capsular distension and CSI today.   -Had improvement in pain and range of motion status postinjection.  Was to go to PT to do light range of motion after injection.  Warned of worsening symptoms after PT once numbness wore off.  -Post-injection instructions were provided to the patient  -Return sooner if develops new or worsening symptoms.  -Cortisone injection today.. can repeat every 3-4 months as needed. Discussed risks. Consented. Tolerated well. See procedure note.      Options for treatment and/or follow-up care were reviewed with the patient was actively involved in the decision making process. Patient verbalized understanding and was in agreement with the plan.    Agreeable to CSI injection injection. Discussed risks and benefits. they are aware of risks such as skin hypopigmentation, hyperglycemia, bleeding, and infection.         Dulce Castillo DO  [facility]        Post-Injection Discharge Instructions    You may shower, however avoid swimming, tub baths or hot tubs for 24 hours following your procedure  You may have a mild to moderate increase in pain for  a few days following the injection.  The lidocaine (local numbing medicine) will wear off in several hours. It usually takes 3-5 days for the steroid medication to start working although it may take up to 14 days for full effect.   You may use ice packs for 10-15 minutes, 3 to 4 times a day at the injection site for comfort if needed  You may use extra strength Tylenol for pain control if necessary   If you were fasting, you may resume your normal diet and medications after the procedure  If you have diabetes, your blood sugar may be higher than normal for 10-14 days following a steroid injection. Contact your doctor who manages your diabetes if your blood sugar is significantly higher than usual    If you experience any of the following, call Sports Medicine @ [phone] or [phone]  -Fever over 100 degrees F  -Swelling, bleeding, redness, drainage, warmth at the injection site  -New or significant worsening pain        Dr. Dulce Castillo DO  [facility]  Sports Medicine     Large Joint Injection/Arthocentesis: R glenohumeral joint    Date/Time: 4/10/2024 9:33 AM    Performed by: Dulce Castillo DO  Authorized by: Dulce Castillo DO    Indications:  Pain and osteoarthritis  Needle Size:  22 G  Guidance: ultrasound    Approach:  Posterior  Location:  Shoulder      Site:  R glenohumeral joint  Medications:  40 mg triamcinolone 40 MG/ML; 14 mL ROPivacaine 5 MG/ML; 3 mL lidocaine 1 %  Medications comment:  10mL of Bacteriostatic 0.9% Sodium Chloride administered into the joint  Outcome:  Tolerated well, no immediate complications  Procedure discussed: discussed risks, benefits, and alternatives    Consent Given by:  Patient  Timeout: timeout called immediately prior to procedure    Prep: patient was prepped and draped in usual sterile fashion     Ultrasound was used to ensure safe and accurate needle placement and injection. Ultrasound images of the procedure were permanently  stored.          Again, thank you for allowing me to participate in the care of your patient.        Sincerely,        Dulce Castillo, DO

## 2024-04-10 NOTE — PATIENT INSTRUCTIONS
1. Incomplete rotator cuff tear or rupture of right shoulder, not specified as traumatic    2. Adhesive capsulitis of right shoulder        Follow up with Dr. Martinez and continue PT       Injection Discharge Instructions    You may shower, however avoid swimming, tub baths or hot tubs for 24 hours following your procedure  You may have a mild to moderate increase in pain for several days following the injection.  It may take up to 14 days for the steroid medication to start working although you may feel the effect as early as a few days after the procedure.  You may use ice packs for 10-15 minutes, 3 to 4 times a day at the injection site for comfort  You may use anti-inflammatory medications (such as Ibuprofen or Aleve or Advil) or Tylenol for pain control if necessary  If you were fasting, you may resume your normal diet and medications after the procedure  If you have diabetes, check your blood sugar more frequently than usual as your blood sugar may be higher than normal for 10-14 days following a steroid injection. Contact your doctor who manages your diabetes if your blood sugar is higher than usual    If you experience any of the following, call  @ [phone] or [phone]  -Fever over 100 degree F  -Swelling, bleeding, redness, drainage, warmth at the injection site  - New or worsening pain      Please call [phone]  Ask for my team if you have any questions or concerns    Dulce Castillo DO  [facility]      Thank you for choosing [facility]!    CLINIC LOCATIONS:     [place]  TRIAGE LINE: [phone]   [address] [place] [address] APPOINTMENTS: [phone]   [place] [address] RADIOLOGY: [phone]   (Monday, Thursday & Friday) PHYSICAL THERAPY: [phone]    BILLING QUESTIONS: [phone]   [place] FAX: [phone] [address]    [place] [address]    (Wednesday)

## 2024-04-10 NOTE — PROGRESS NOTES
PHYSICAL THERAPY EVALUATION  Type of Visit: Evaluation    See electronic medical record for Abuse and Falls Screening details.    Subjective       Presenting condition or subjective complaint: Post Cortison shot for frozen shoulder  Patient presents to PT after R GH cortisone injection on 4/10/24 for her adhesive capsulitis. She presents back to PT for continued stretching routine and pain management.   Date of onset: 01/01/23    Relevant medical history: Cancer; Menopause; Migraines or headaches; Pain at night or rest   Dates & types of surgery:    Past Medical History:   Diagnosis Date    Endometrial cancer (H) 09/2008    Stage I B    CATIA BSO    Urinary incontinence      Prior diagnostic imaging/testing results: MRI; X-ray     Prior therapy history for the same diagnosis, illness or injury: Yes      Prior Level of Function Independent    Living Environment  Social support: With a significant other or spouse   Type of home: House   Stairs to enter the home: Yes 4 Is there a railing: No   Ramp: No   Stairs inside the home: Yes 12 Is there a railing: Yes   Help at home: None  Equipment owned:       Employment: No    Hobbies/Interests:      Patient goals for therapy: Use my rt arm    Pain assessment: Pain present R shoulder at deltoid insertion and anterior R shoulder      Objective   SHOULDER EVALUATION  POSTURE:  seated forward head position and forward rounded shoulders, able to improve with cues  ROM: L2 T8  (Degrees) Right AROM  Right PROM   Shoulder Flexion 132 deg in standing  140 deg in supine 144 deg   Shoulder Extension     Shoulder Abduction 120 132   Shoulder Adduction     Shoulder Internal Rotation L2 (noted some discomfort) 45   Shoulder External Rotation  42   Pain: no pain with motion today since cortisone injection this morning  End feel: muscular tension and stiffness    STRENGTH:   Pain: - none + mild ++ moderate +++ severe  Strength Scale: 0-5/5 Left Right   Shoulder Flexion 5- 4   Shoulder  Extension 5 5   Shoulder Abduction 5- 4-   Shoulder Adduction     Shoulder Internal Rotation 5 5-   Shoulder External Rotation 5 4+   Shoulder Horizontal Abduction     Shoulder Horizontal Adduction     Elbow Flexion 5 5   Elbow Extension 5 5   Mid Trap     Lower Trap     Rhomboid     Serratus Anterior       FLEXIBILITY:  decreased posterior capsule, B pecs  PALPATION:  minimal TTP today as long as shoulder is at rest, mild tenderness along R medial scapular boarder and lateral deltoid  JOINT MOBILITY:  R GH joint hypomobile  CERVICAL SCREEN: WFL    Assessment & Plan   CLINICAL IMPRESSIONS  Medical Diagnosis: Incomplete rotator cuff tear or rupture of right shoulder, not specified as traumatic  Tear of right glenoid labrum, initial encounter    Treatment Diagnosis: R shoulder pain 2/2 Incomplete rotator cuff tear or rupture of right shoulder, not specified as traumatic and tear of right glenoid labrum; stiffness 2/2 adhesive capsulitis   Impression/Assessment: Patient is a 68 year old female with R shoulder pain and limited mobility 2/2 active case of adhesive capsulitis, RTC pathology, and labral tear. The following significant findings have been identified: Pain, Decreased ROM/flexibility, Decreased joint mobility, Decreased strength, Impaired muscle performance, Decreased activity tolerance, and Impaired posture. These impairments interfere with their ability to perform self care tasks, recreational activities, household chores, driving , household mobility, community mobility, and workouts  as compared to previous level of function.     Clinical Decision Making (Complexity):  Clinical Presentation: Evolving/Changing  Clinical Presentation Rationale: based on medical and personal factors listed in PT evaluation  Clinical Decision Making (Complexity): Low complexity    PLAN OF CARE  Treatment Interventions:  Interventions: Manual Therapy, Neuromuscular Re-education, Therapeutic Activity, Therapeutic Exercise,  Self-Care/Home Management    Long Term Goals     PT Goal 1  Goal Identifier: HEP  Goal Description: Patient will be consistent and independent with HEP in order to achieve functional goals.  Rationale: to maximize safety and independence with performance of ADLs and functional tasks;to maximize safety and independence within the home;to maximize safety and independence within the community;to maximize safety and independence with self cares  Target Date: 05/10/24  PT Goal 2  Goal Identifier: Shoulder AROM  Goal Description: Patient will demonstrate improved R shoulder AROM to at least 150 deg of flexion and 140 deg abduction and ability to reach to her waistline with decrease pain and return to functional activities such as reaching overhead to the cupboard.  Rationale: to maximize safety and independence with performance of ADLs and functional tasks;to maximize safety and independence within the home;to maximize safety and independence within the community;to maximize safety and independence with transportation;to maximize safety and independence with self cares  Target Date: 06/19/24      Frequency of Treatment: 1 x week  Duration of Treatment: 10 weeks    Recommended Referrals to Other Professionals:   Education Assessment:   Learner/Method: Patient;No Barriers to Learning;Significant Other  Education Comments: established PT POC and stretching HEP, all questions answered.    Risks and benefits of evaluation/treatment have been explained.   Patient/Family/caregiver agrees with Plan of Care.     Evaluation Time:     PT Eval, Low Complexity Minutes (09447): 14    Signing Clinician: Mirna Casper PT      [facility]                                                                                   OUTPATIENT PHYSICAL THERAPY      PLAN OF TREATMENT FOR OUTPATIENT REHABILITATION   Patient's Last Name, First Name, Mary Jane Gaspar YOB: 1955   Provider's Name   JOCELYNN  [facility]   Medical Record No.  [id number]     Onset Date: 01/01/23  Start of Care Date: 04/10/24     Medical Diagnosis:  Incomplete rotator cuff tear or rupture of right shoulder, not specified as traumatic  Tear of right glenoid labrum, initial encounter      PT Treatment Diagnosis:  R shoulder pain 2/2 Incomplete rotator cuff tear or rupture of right shoulder, not specified as traumatic and tear of right glenoid labrum; stiffness 2/2 adhesive capsulitis Plan of Treatment  Frequency/Duration: 1 x week/ 10 weeks    Certification date from 04/10/24 to 06/19/24         See note for plan of treatment details and functional goals     Mirna Casper, PT                         I CERTIFY THE NEED FOR THESE SERVICES FURNISHED UNDER        THIS PLAN OF TREATMENT AND WHILE UNDER MY CARE     (Physician attestation of this document indicates review and certification of the therapy plan).              Referring Provider:  José Martinez    Initial Assessment  See Epic Evaluation- Start of Care Date: 04/10/24

## 2024-04-17 ENCOUNTER — THERAPY VISIT (OUTPATIENT)
Dept: PHYSICAL THERAPY | Facility: CLINIC | Age: 69
End: 2024-04-17
Payer: COMMERCIAL

## 2024-04-17 DIAGNOSIS — M75.111 INCOMPLETE ROTATOR CUFF TEAR OR RUPTURE OF RIGHT SHOULDER, NOT SPECIFIED AS TRAUMATIC: Primary | ICD-10-CM

## 2024-04-17 DIAGNOSIS — S43.431A TEAR OF RIGHT GLENOID LABRUM, INITIAL ENCOUNTER: ICD-10-CM

## 2024-04-17 DIAGNOSIS — M25.511 RIGHT SHOULDER PAIN, UNSPECIFIED CHRONICITY: ICD-10-CM

## 2024-04-17 PROCEDURE — 97110 THERAPEUTIC EXERCISES: CPT | Mod: GP | Performed by: PHYSICAL THERAPIST

## 2024-05-01 ENCOUNTER — THERAPY VISIT (OUTPATIENT)
Dept: PHYSICAL THERAPY | Facility: CLINIC | Age: 69
End: 2024-05-01
Payer: COMMERCIAL

## 2024-05-01 DIAGNOSIS — M75.01 ADHESIVE CAPSULITIS OF RIGHT SHOULDER: ICD-10-CM

## 2024-05-01 DIAGNOSIS — M25.511 RIGHT SHOULDER PAIN, UNSPECIFIED CHRONICITY: ICD-10-CM

## 2024-05-01 DIAGNOSIS — S43.431A TEAR OF RIGHT GLENOID LABRUM, INITIAL ENCOUNTER: ICD-10-CM

## 2024-05-01 DIAGNOSIS — M75.111 INCOMPLETE ROTATOR CUFF TEAR OR RUPTURE OF RIGHT SHOULDER, NOT SPECIFIED AS TRAUMATIC: Primary | ICD-10-CM

## 2024-05-01 PROCEDURE — 97110 THERAPEUTIC EXERCISES: CPT | Mod: GP | Performed by: PHYSICAL THERAPIST

## 2024-05-29 ENCOUNTER — THERAPY VISIT (OUTPATIENT)
Dept: PHYSICAL THERAPY | Facility: CLINIC | Age: 69
End: 2024-05-29
Payer: COMMERCIAL

## 2024-05-29 DIAGNOSIS — M75.111 INCOMPLETE ROTATOR CUFF TEAR OR RUPTURE OF RIGHT SHOULDER, NOT SPECIFIED AS TRAUMATIC: Primary | ICD-10-CM

## 2024-05-29 DIAGNOSIS — M25.511 RIGHT SHOULDER PAIN, UNSPECIFIED CHRONICITY: ICD-10-CM

## 2024-05-29 DIAGNOSIS — M75.01 ADHESIVE CAPSULITIS OF RIGHT SHOULDER: ICD-10-CM

## 2024-05-29 DIAGNOSIS — S43.431A TEAR OF RIGHT GLENOID LABRUM, INITIAL ENCOUNTER: ICD-10-CM

## 2024-05-29 PROCEDURE — 97110 THERAPEUTIC EXERCISES: CPT | Mod: GP | Performed by: PHYSICAL THERAPIST

## 2024-05-29 NOTE — PROGRESS NOTES
PT PROGRESS NOTE  Patient has demonstrates some improvement in R shoulder AROM (see note below) but continues to be stiff, has decreased functional mobility, and is experiencing increased pain in the last 2 weeks compared to after she received the cortisone injection on 4/10/24. Patient notes that she has been less consistent with her exercises over the past 2 weeks, but doing more around the house with her R arm.  PLAN  Return to Dr. Martinez for follow up and evaluation of options related to R shoulder treatment.   PT POC dates: 4/10/24 to 6/19/24    Beginning/End Dates of Progress Note Reporting Period:  04/10/24 to 05/29/2024    Referring Provider:  José Martinez       05/29/24 0500   Appointment Info   Signing clinician's name / credentials Mirna Casper PT, DPT   Total/Authorized Visits 10E&T   Visits Used 4   Medical Diagnosis Incomplete rotator cuff tear or rupture of right shoulder, not specified as traumatic  Tear of right glenoid labrum, initial encounter   PT Tx Diagnosis R shoulder pain 2/2 Incomplete rotator cuff tear or rupture of right shoulder, not specified as traumatic and tear of right glenoid labrum; stiffness 2/2 adhesive capsulitis   Quick Adds Certification   Progress Note/Certification   Start of Care Date 04/10/24   Onset of illness/injury or Date of Surgery 01/01/23   Therapy Frequency 1 x week   Predicted Duration 10 weeks   Certification date from 04/10/24   Certification date to 06/19/24   Progress Note Due Date 06/19/24   Progress Note Completed Date 04/10/24   GOALS   PT Goals 2   PT Goal 1   Goal Identifier HEP   Goal Description Patient will be consistent and independent with HEP in order to achieve functional goals.   Rationale to maximize safety and independence with performance of ADLs and functional tasks;to maximize safety and independence within the home;to maximize safety and independence within the community;to maximize safety and independence with self cares   Goal Progress  24: progressing with HEP completions   Target Date 05/10/24   Date Met 24   PT Goal 2   Goal Identifier Shoulder AROM   Goal Description Patient will demonstrate improved R shoulder AROM to at least 150 deg of flexion and 140 deg abduction and ability to reach to her waistline with decrease pain and return to functional activities such as reaching overhead to the cupboard.   Rationale to maximize safety and independence with performance of ADLs and functional tasks;to maximize safety and independence within the home;to maximize safety and independence within the community;to maximize safety and independence with transportation;to maximize safety and independence with self cares   Goal Progress 24: continues progressing, remains limited in R shoulder IR 24: pt has progressed, but remains limited in R shoulder AROM with abduction and IR (see note below)   Target Date 24   Subjective Report   Subjective Report Patient notes that she has feels like she has experienced a bout of locking in her shoulders.   Objective Measures   Objective Measures Objective Measure 1   Objective Measure 1   Objective Measure R Shoulder Motion   Details R shoulder flexion supine: 150 deg, standin deg R shoulder abduction in standing   Treatment Interventions (PT)   Interventions Manual Therapy   Therapeutic Procedure/Exercise   Therapeutic Procedures: strength, endurance, ROM, flexibility minutes (56433) 26   Ther Proc 2 R Shoulder PROM   Ther Proc 2 - Details 2 minutes therapist assisted- improved tolerance as ROM progressed   PTRx Ther Proc 1 Wall Climb   PTRx Ther Proc 1 - Details x20 with washcloth on wall   PTRx Ther Proc 2 Wall Slides Abduction   PTRx Ther Proc 2 - Details x20 with washcloth on wall   PTRx Ther Proc 3 Wand Shoulder Extension Standing   PTRx Ther Proc 3 - Details No Notes   PTRx Ther Proc 4 Wand Shoulder Flexion Supine   PTRx Ther Proc 4 - Details x20 with 1# weight without wand   PTRx  Ther Proc 5 Shoulder Sidelying Abduction   PTRx Ther Proc 5 - Details x20 with 1# weight   PTRx Ther Proc 6 Shoulder External Rotation Sidelying   PTRx Ther Proc 6 - Details NC today   PTRx Ther Proc 7 Wand Shoulder Internal Rotation   PTRx Ther Proc 7 - Details x15   PTRx Ther Proc 8 Wand Shoulder External Rotation at 90 degrees Abduction   PTRx Ther Proc 8 - Details 2x10   PTRx Ther Proc 9 Passive Shoulder Flexion   PTRx Ther Proc 9 - Details No Notes   PTRx Ther Proc 10 Side-lying Posterior Capsule Stretch   PTRx Ther Proc 10 - Details No Notes   PTRx Ther Proc 11 Internal Rotation Behind Back with Overpressure   PTRx Ther Proc 11 - Details No Notes   Manual Therapy   Manual Therapy: Mobilization, MFR, MLD, friction massage minutes (36340) 5   Manual Therapy 1 STM   Manual Therapy 1 - Details posterior scapular spine and scapula to decrease tissue tension for evaluation if ROM improves   Skilled Intervention for improved motion   Patient Response/Progress minimal improvement in R shoulder AROM   Education   Learner/Method Patient;Listening;Demonstration;Pictures/Video   Education Comments modified HEP, education related to shoulder mobility and functional tasks   Plan   Home program Ptrx HEP- on her phone   Updates to plan of care progress note, follow up with Dr. Martinez on 6/12/24   Plan for next session progress with motion and strengthening dependent on follow up with Dr. Martinez?   Total Session Time   Timed Code Treatment Minutes 31   Total Treatment Time (sum of timed and untimed services) 31

## 2024-06-05 ENCOUNTER — ANCILLARY PROCEDURE (OUTPATIENT)
Dept: BONE DENSITY | Facility: CLINIC | Age: 69
End: 2024-06-05
Attending: FAMILY MEDICINE
Payer: COMMERCIAL

## 2024-06-05 DIAGNOSIS — M85.80 OSTEOPENIA, UNSPECIFIED LOCATION: ICD-10-CM

## 2024-06-05 DIAGNOSIS — Z78.0 ASYMPTOMATIC MENOPAUSE: ICD-10-CM

## 2024-06-05 PROCEDURE — 77080 DXA BONE DENSITY AXIAL: CPT | Mod: TC | Performed by: PHYSICIAN ASSISTANT

## 2024-06-12 ENCOUNTER — OFFICE VISIT (OUTPATIENT)
Dept: ORTHOPEDICS | Facility: CLINIC | Age: 69
End: 2024-06-12
Payer: COMMERCIAL

## 2024-06-12 VITALS
HEIGHT: 68 IN | SYSTOLIC BLOOD PRESSURE: 141 MMHG | BODY MASS INDEX: 28.34 KG/M2 | DIASTOLIC BLOOD PRESSURE: 87 MMHG | WEIGHT: 187 LBS

## 2024-06-12 DIAGNOSIS — M75.01 ADHESIVE CAPSULITIS OF RIGHT SHOULDER: Primary | ICD-10-CM

## 2024-06-12 PROCEDURE — 99213 OFFICE O/P EST LOW 20 MIN: CPT | Performed by: STUDENT IN AN ORGANIZED HEALTH CARE EDUCATION/TRAINING PROGRAM

## 2024-06-12 NOTE — PROGRESS NOTES
CC: Right frozen shoulder    HPI: Patient is a 68-year-old female known to my clinic with a history of right frozen shoulder and partial-thickness supraspinatus tear.  For full history and physical please see my note from March 18.  In brief review, patient has been struggling with global shoulder pain and stiffness.  She had significant limitations in passive range of motion in 3 planes.  MRI also showed high-grade partial-thickness tear of the supraspinatus tendon.  She underwent an intra-articular glenohumeral injection with my sports medicine colleague, Dr Castillo, on April 10.  She has been doing physical therapy for passive range of motion at our [place] location.  She states she had made significant improvements in her range of motion.  Her pain is significantly improved with this.  She is back to all of her day-to-day activities.  She tells me her shoulder is 99.9% better.    Objective:   PE:  RUE: No open wounds or lacerations noted.  No pain to palpation over the clavicle, AC joint, or acromion.  No pain to palpation over the anterior joint line, lateral shoulder, or posterior joint line.  Mild pain to palpation over the deltoid insertion on the humerus.  Passive range of motion 170 degrees of forward elevation, 1 or 60 degrees abduction, 60 degrees external rotation, T12 internal rotation.  Active range of motion is equivalent to passive range of motion.  5/5 shoulder flexion, abduction, internal and external rotation.  Negative Jobes.    A/P:  Patient is a 68-year-old female seen here today in follow-up for right shoulder he is of capsulitis.  She has made significant improvements with physical therapy.  Her range of motion is almost back to normal.  Her symptoms of pain have significantly decreased.  She is very happy with the function of her shoulder.  I had a discussion with her today about the risk of recurrence.  I highly encouraged her to develop a 10 to 15-minute daily routine of bilateral  shoulder stretching and light strengthening exercises for her to do daily for the rest of her life to promote long-term health of her shoulders and prevent recurrence.  She has a history of frozen shoulder in both shoulders.  I would also encourage her to continue with physical therapy.  At this time point she is cleared to start strengthening activities.  I discussed with her the signs of symptoms of symptomatic partial-thickness rotator cuff tear.  This specifically is focal pain over the anterior lateral aspect of the shoulder with overhead activities, lifting objects away from her body, and at night.  If she starts develop the symptoms, we again discussed treatment options for partial-thickness rotator cuff tears including but not limited to oral anti-inflammatory medication, physical therapy, subacromial corticosteroid injection, or arthroscopic surgery.  At this time point she is not having the symptoms.  She is going to continue with PT.  She will follow-up with me as needed for her right shoulder at any time point in the future.    José Martinez MD    [facility]   Department of Orthopedic Surgery      Disclaimer: This note consists of symbols derived from keyboarding, dictation and/or voice recognition software. As a result, there may be errors in the script that have gone undetected. Please consider this when interpreting information found in this chart.

## 2024-06-12 NOTE — LETTER
6/12/2024      Mary Jane Matta  [address] [place] [facility] [address]      Dear Colleague,    Thank you for referring your patient, Mary Jane Matta, to the [facility] [place]. Please see a copy of my visit note below.    CC: Right frozen shoulder    HPI: Patient is a 68-year-old female known to my clinic with a history of right frozen shoulder and partial-thickness supraspinatus tear.  For full history and physical please see my note from March 18.  In brief review, patient has been struggling with global shoulder pain and stiffness.  She had significant limitations in passive range of motion in 3 planes.  MRI also showed high-grade partial-thickness tear of the supraspinatus tendon.  She underwent an intra-articular glenohumeral injection with my sports medicine colleague, Dr Castillo, on April 10.  She has been doing physical therapy for passive range of motion at our [place] location.  She states she had made significant improvements in her range of motion.  Her pain is significantly improved with this.  She is back to all of her day-to-day activities.  She tells me her shoulder is 99.9% better.    Objective:   PE:  RUE: No open wounds or lacerations noted.  No pain to palpation over the clavicle, AC joint, or acromion.  No pain to palpation over the anterior joint line, lateral shoulder, or posterior joint line.  Mild pain to palpation over the deltoid insertion on the humerus.  Passive range of motion 170 degrees of forward elevation, 1 or 60 degrees abduction, 60 degrees external rotation, T12 internal rotation.  Active range of motion is equivalent to passive range of motion.  5/5 shoulder flexion, abduction, internal and external rotation.  Negative Jobes.    A/P:  Patient is a 68-year-old female seen here today in follow-up for right shoulder he is of capsulitis.  She has made significant improvements with physical therapy.  Her range of motion is almost back to  normal.  Her symptoms of pain have significantly decreased.  She is very happy with the function of her shoulder.  I had a discussion with her today about the risk of recurrence.  I highly encouraged her to develop a 10 to 15-minute daily routine of bilateral shoulder stretching and light strengthening exercises for her to do daily for the rest of her life to promote long-term health of her shoulders and prevent recurrence.  She has a history of frozen shoulder in both shoulders.  I would also encourage her to continue with physical therapy.  At this time point she is cleared to start strengthening activities.  I discussed with her the signs of symptoms of symptomatic partial-thickness rotator cuff tear.  This specifically is focal pain over the anterior lateral aspect of the shoulder with overhead activities, lifting objects away from her body, and at night.  If she starts develop the symptoms, we again discussed treatment options for partial-thickness rotator cuff tears including but not limited to oral anti-inflammatory medication, physical therapy, subacromial corticosteroid injection, or arthroscopic surgery.  At this time point she is not having the symptoms.  She is going to continue with PT.  She will follow-up with me as needed for her right shoulder at any time point in the future.    José Martinez MD    [facility]   Department of Orthopedic Surgery      Disclaimer: This note consists of symbols derived from keyboarding, dictation and/or voice recognition software. As a result, there may be errors in the script that have gone undetected. Please consider this when interpreting information found in this chart.         Again, thank you for allowing me to participate in the care of your patient.        Sincerely,        José Martinez MD

## 2024-06-18 ENCOUNTER — THERAPY VISIT (OUTPATIENT)
Dept: PHYSICAL THERAPY | Facility: CLINIC | Age: 69
End: 2024-06-18
Payer: COMMERCIAL

## 2024-06-18 DIAGNOSIS — M75.111 INCOMPLETE ROTATOR CUFF TEAR OR RUPTURE OF RIGHT SHOULDER, NOT SPECIFIED AS TRAUMATIC: Primary | ICD-10-CM

## 2024-06-18 DIAGNOSIS — M25.511 RIGHT SHOULDER PAIN, UNSPECIFIED CHRONICITY: ICD-10-CM

## 2024-06-18 DIAGNOSIS — S43.431A TEAR OF RIGHT GLENOID LABRUM, INITIAL ENCOUNTER: ICD-10-CM

## 2024-06-18 DIAGNOSIS — M75.01 ADHESIVE CAPSULITIS OF RIGHT SHOULDER: ICD-10-CM

## 2024-06-18 PROCEDURE — 97110 THERAPEUTIC EXERCISES: CPT | Mod: GP | Performed by: PHYSICAL THERAPIST

## 2024-06-18 NOTE — PROGRESS NOTES
PT DISCHARGE  Reason for Discharge: Patient has met all goals.    Discharge Plan: Patient to continue home program/stretching program independently.    Referring Provider:  José Martinez       06/18/24 0500   Appointment Info   Signing clinician's name / credentials Mirna Casper PT, DPT   Total/Authorized Visits 10E&T   Visits Used 5   Medical Diagnosis Incomplete rotator cuff tear or rupture of right shoulder, not specified as traumatic  Tear of right glenoid labrum, initial encounter   PT Tx Diagnosis R shoulder pain 2/2 Incomplete rotator cuff tear or rupture of right shoulder, not specified as traumatic and tear of right glenoid labrum; stiffness 2/2 adhesive capsulitis   Quick Adds Certification   Progress Note/Certification   Start of Care Date 04/10/24   Onset of illness/injury or Date of Surgery 01/01/23   Therapy Frequency 1 x week   Predicted Duration 10 weeks   Certification date from 04/10/24   Certification date to 06/19/24   Progress Note Due Date 06/19/24   Progress Note Completed Date 04/10/24   GOALS   PT Goals 2   PT Goal 1   Goal Identifier HEP   Goal Description Patient will be consistent and independent with HEP in order to achieve functional goals.   Rationale to maximize safety and independence with performance of ADLs and functional tasks;to maximize safety and independence within the home;to maximize safety and independence within the community;to maximize safety and independence with self cares   Goal Progress 4/17/24: progressing with HEP completions   Target Date 05/10/24   Date Met 05/01/24   PT Goal 2   Goal Identifier Shoulder AROM   Goal Description Patient will demonstrate improved R shoulder AROM to at least 150 deg of flexion and 140 deg abduction and ability to reach to her waistline with decrease pain and return to functional activities such as reaching overhead to the cupboard.   Rationale to maximize safety and independence with performance of ADLs and functional tasks;to  maximize safety and independence within the home;to maximize safety and independence within the community;to maximize safety and independence with transportation;to maximize safety and independence with self cares   Goal Progress met   Target Date 06/19/24   Date Met 06/18/24   Subjective Report   Subjective Report Pt reports that over the past two weeks her shoulder has been feeling much better. She had her visit with Dr. Martinez and states she is cleared to progress for strengthening. He also recommneded continuing with a 15 min/day stretching routine to maintain her motion. Pt is open to this. States she is happy that she has been able to do so much ast home lately.   Objective Measure 1   Objective Measure R shoulder AROM   Details WFL for all functional tasks including reaching to cupboard   Therapeutic Procedure/Exercise   Therapeutic Procedures: strength, endurance, ROM, flexibility minutes (44245) 30   Ther Proc 2 Wall Taps   Ther Proc 2 - Details RTB for strengthening HEP continue 3x per week x10-20 reps   PTRx Ther Proc 1 Wall Climb   PTRx Ther Proc 1 - Details x15 continue for stretching HEP   PTRx Ther Proc 2 Wall Slides Abduction   PTRx Ther Proc 2 - Details x15 continue for stretching HEP   PTRx Ther Proc 3 Wand Shoulder Extension Standing   PTRx Ther Proc 3 - Details x15 continue for stretching HEP   PTRx Ther Proc 4 Wand Shoulder Flexion Supine   PTRx Ther Proc 4 - Details x15 continue for stretching HEP   PTRx Ther Proc 5 Shoulder Sidelying Abduction   PTRx Ther Proc 5 - Details x15 continue for stretching HEP   PTRx Ther Proc 6 Shoulder External Rotation Sidelying   PTRx Ther Proc 6 - Details x15 continue for stretching HEP   PTRx Ther Proc 7 Side-lying Posterior Capsule Stretch   PTRx Ther Proc 7 - Details x15 seconds to 45 second holdscontinue for stretching HEP   PTRx Ther Proc 8 Internal Rotation Behind Back with Overpressure   PTRx Ther Proc 8 - Details x15 continue for stretching HEP   PTRx  Ther Proc 9 Theraband Rows   PTRx Ther Proc 9 - Details GTB x20 continue 3-5x per week   PTRx Ther Proc 10 Overhead Shoulder Extension   PTRx Ther Proc 10 - Details GTB x20 continue 3-5x per week   PTRx Ther Proc 11 Lat Pull Downs   PTRx Ther Proc 11 - Details GTB x20 continue 3-5x per week- band in doorway   Skilled Intervention education, establish HEP for stretching routine   Patient Response/Progress tolerated all movements well and demonstrates agreement/understanding for session/continuing independently   Education   Learner/Method Patient;Demonstration;No Barriers to Learning;Pictures/Video   Education Comments update to stretching routine and then independent continuation of exercises   Plan   Home program Ptrx HEP- on her phone   Updates to plan of care d/c   Total Session Time   Timed Code Treatment Minutes 30   Total Treatment Time (sum of timed and untimed services) 30

## 2024-06-28 DIAGNOSIS — Z12.11 COLON CANCER SCREENING: ICD-10-CM

## 2024-06-29 ENCOUNTER — HEALTH MAINTENANCE LETTER (OUTPATIENT)
Age: 69
End: 2024-06-29

## 2024-07-12 ENCOUNTER — ORDERS ONLY (AUTO-RELEASED) (OUTPATIENT)
Dept: ADMISSION | Facility: CLINIC | Age: 69
End: 2024-07-12
Payer: COMMERCIAL

## 2024-07-12 DIAGNOSIS — Z12.11 COLON CANCER SCREENING: ICD-10-CM

## 2024-07-30 LAB — NONINV COLON CA DNA+OCC BLD SCRN STL QL: NEGATIVE

## 2024-08-07 SDOH — HEALTH STABILITY: PHYSICAL HEALTH: ON AVERAGE, HOW MANY DAYS PER WEEK DO YOU ENGAGE IN MODERATE TO STRENUOUS EXERCISE (LIKE A BRISK WALK)?: 1 DAY

## 2024-08-07 SDOH — HEALTH STABILITY: PHYSICAL HEALTH: ON AVERAGE, HOW MANY MINUTES DO YOU ENGAGE IN EXERCISE AT THIS LEVEL?: 50 MIN

## 2024-08-07 ASSESSMENT — SOCIAL DETERMINANTS OF HEALTH (SDOH): HOW OFTEN DO YOU GET TOGETHER WITH FRIENDS OR RELATIVES?: ONCE A WEEK

## 2024-08-08 ENCOUNTER — OFFICE VISIT (OUTPATIENT)
Dept: FAMILY MEDICINE | Facility: CLINIC | Age: 69
End: 2024-08-08
Payer: COMMERCIAL

## 2024-08-08 VITALS
BODY MASS INDEX: 28.56 KG/M2 | HEIGHT: 67 IN | DIASTOLIC BLOOD PRESSURE: 89 MMHG | RESPIRATION RATE: 16 BRPM | SYSTOLIC BLOOD PRESSURE: 131 MMHG | WEIGHT: 182 LBS | TEMPERATURE: 97.8 F | HEART RATE: 95 BPM | OXYGEN SATURATION: 96 %

## 2024-08-08 DIAGNOSIS — Z00.00 ENCOUNTER FOR MEDICARE ANNUAL WELLNESS EXAM: Primary | ICD-10-CM

## 2024-08-08 DIAGNOSIS — M25.50 MULTIPLE JOINT PAIN: ICD-10-CM

## 2024-08-08 DIAGNOSIS — Z12.31 ENCOUNTER FOR SCREENING MAMMOGRAM FOR BREAST CANCER: ICD-10-CM

## 2024-08-08 DIAGNOSIS — R10.30 LOWER ABDOMINAL PAIN: ICD-10-CM

## 2024-08-08 DIAGNOSIS — F51.01 PRIMARY INSOMNIA: ICD-10-CM

## 2024-08-08 LAB
ALBUMIN SERPL BCG-MCNC: 4.3 G/DL (ref 3.5–5.2)
ALP SERPL-CCNC: 102 U/L (ref 40–150)
ALT SERPL W P-5'-P-CCNC: 13 U/L (ref 0–50)
ANION GAP SERPL CALCULATED.3IONS-SCNC: 11 MMOL/L (ref 7–15)
AST SERPL W P-5'-P-CCNC: 19 U/L (ref 0–45)
BILIRUB SERPL-MCNC: 0.4 MG/DL
BUN SERPL-MCNC: 17.6 MG/DL (ref 8–23)
CALCIUM SERPL-MCNC: 9.4 MG/DL (ref 8.8–10.4)
CHLORIDE SERPL-SCNC: 106 MMOL/L (ref 98–107)
CREAT SERPL-MCNC: 0.84 MG/DL (ref 0.51–0.95)
EGFRCR SERPLBLD CKD-EPI 2021: 75 ML/MIN/1.73M2
ERYTHROCYTE [SEDIMENTATION RATE] IN BLOOD BY WESTERGREN METHOD: 14 MM/HR (ref 0–30)
GLUCOSE SERPL-MCNC: 90 MG/DL (ref 70–99)
HCO3 SERPL-SCNC: 26 MMOL/L (ref 22–29)
POTASSIUM SERPL-SCNC: 4.9 MMOL/L (ref 3.4–5.3)
PROT SERPL-MCNC: 7.3 G/DL (ref 6.4–8.3)
SODIUM SERPL-SCNC: 143 MMOL/L (ref 135–145)
TSH SERPL DL<=0.005 MIU/L-ACNC: 2.18 UIU/ML (ref 0.3–4.2)

## 2024-08-08 PROCEDURE — 80053 COMPREHEN METABOLIC PANEL: CPT | Performed by: FAMILY MEDICINE

## 2024-08-08 PROCEDURE — 84443 ASSAY THYROID STIM HORMONE: CPT | Performed by: FAMILY MEDICINE

## 2024-08-08 PROCEDURE — 80061 LIPID PANEL: CPT | Performed by: FAMILY MEDICINE

## 2024-08-08 PROCEDURE — 99214 OFFICE O/P EST MOD 30 MIN: CPT | Mod: 25 | Performed by: FAMILY MEDICINE

## 2024-08-08 PROCEDURE — 36415 COLL VENOUS BLD VENIPUNCTURE: CPT | Performed by: FAMILY MEDICINE

## 2024-08-08 PROCEDURE — 85652 RBC SED RATE AUTOMATED: CPT | Performed by: FAMILY MEDICINE

## 2024-08-08 PROCEDURE — G0439 PPPS, SUBSEQ VISIT: HCPCS | Performed by: FAMILY MEDICINE

## 2024-08-08 RX ORDER — ZOLPIDEM TARTRATE 5 MG/1
5 TABLET ORAL
Qty: 14 TABLET | Refills: 0 | Status: SHIPPED | OUTPATIENT
Start: 2024-08-08

## 2024-08-08 NOTE — PATIENT INSTRUCTIONS
FODMAP diet    Culturelle - lactobacillus  25-28 grams per day - try increasing 5 grams per week      Patient Education   Preventive Care Advice   This is general advice given by our system to help you stay healthy. However, your care team may have specific advice just for you. Please talk to your care team about your preventive care needs.  Nutrition  Eat 5 or more servings of fruits and vegetables each day.  Try wheat bread, brown rice and whole grain pasta (instead of white bread, rice, and pasta).  Get enough calcium and vitamin D. Check the label on foods and aim for 100% of the RDA (recommended daily allowance).  Lifestyle  Exercise at least 150 minutes each week  (30 minutes a day, 5 days a week).  Do muscle strengthening activities 2 days a week. These help control your weight and prevent disease.  No smoking.  Wear sunscreen to prevent skin cancer.  Have a dental exam and cleaning every 6 months.  Yearly exams  See your health care team every year to talk about:  Any changes in your health.  Any medicines your care team has prescribed.  Preventive care, family planning, and ways to prevent chronic diseases.  Shots (vaccines)   HPV shots (up to age 26), if you've never had them before.  Hepatitis B shots (up to age 59), if you've never had them before.  COVID-19 shot: Get this shot when it's due.  Flu shot: Get a flu shot every year.  Tetanus shot: Get a tetanus shot every 10 years.  Pneumococcal, hepatitis A, and RSV shots: Ask your care team if you need these based on your risk.  Shingles shot (for age 50 and up)  General health tests  Diabetes screening:  Starting at age 35, Get screened for diabetes at least every 3 years.  If you are younger than age 35, ask your care team if you should be screened for diabetes.  Cholesterol test: At age 39, start having a cholesterol test every 5 years, or more often if advised.  Bone density scan (DEXA): At age 50, ask your care team if you should have this scan for  osteoporosis (brittle bones).  Hepatitis C: Get tested at least once in your life.  STIs (sexually transmitted infections)  Before age 24: Ask your care team if you should be screened for STIs.  After age 24: Get screened for STIs if you're at risk. You are at risk for STIs (including HIV) if:  You are sexually active with more than one person.  You don't use condoms every time.  You or a partner was diagnosed with a sexually transmitted infection.  If you are at risk for HIV, ask about PrEP medicine to prevent HIV.  Get tested for HIV at least once in your life, whether you are at risk for HIV or not.  Cancer screening tests  Cervical cancer screening: If you have a cervix, begin getting regular cervical cancer screening tests starting at age 21.  Breast cancer scan (mammogram): If you've ever had breasts, begin having regular mammograms starting at age 40. This is a scan to check for breast cancer.  Colon cancer screening: It is important to start screening for colon cancer at age 45.  Have a colonoscopy test every 10 years (or more often if you're at risk) Or, ask your provider about stool tests like a FIT test every year or Cologuard test every 3 years.  To learn more about your testing options, visit:   .  For help making a decision, visit:   https://bit.ly/xk44065.  Prostate cancer screening test: If you have a prostate, ask your care team if a prostate cancer screening test (PSA) at age 55 is right for you.  Lung cancer screening: If you are a current or former smoker ages 50 to 80, ask your care team if ongoing lung cancer screenings are right for you.  For informational purposes only. Not to replace the advice of your health care provider. Copyright   2023 [employer]. All rights reserved. Clinically reviewed by the [facility]. [employer] [id number] - REV 01/24.  Preventing Falls: Care Instructions  Injuries and health problems such as trouble walking or poor eyesight can  increase your risk of falling. So can some medicines. But there are things you can do to help prevent falls. You can exercise to get stronger. You can also arrange your home to make it safer.    Talk to your doctor about the medicines you take. Ask if any of them increase the risk of falls and whether they can be changed or stopped.   Try to exercise regularly. It can help improve your strength and balance. This can help lower your risk of falling.     Practice fall safety and prevention.    Wear low-heeled shoes that fit well and give your feet good support. Talk to your doctor if you have foot problems that make this hard.  Carry a cellphone or wear a medical alert device that you can use to call for help.  Use stepladders instead of chairs to reach high objects. Don't climb if you're at risk for falls. Ask for help, if needed.  Wear the correct eyeglasses, if you need them.    Make your home safer.    Remove rugs, cords, clutter, and furniture from walkways.  Keep your house well lit. Use night-lights in hallways and bathrooms.  Install and use sturdy handrails on stairways.  Wear nonskid footwear, even inside. Don't walk barefoot or in socks without shoes.    Be safe outside.    Use handrails, curb cuts, and ramps whenever possible.  Keep your hands free by using a shoulder bag or backpack.  Try to walk in well-lit areas. Watch out for uneven ground, changes in pavement, and debris.  Be careful in the winter. Walk on the grass or gravel when sidewalks are slippery. Use de-icer on steps and walkways. Add non-slip devices to shoes.    Put grab bars and nonskid mats in your shower or tub and near the toilet. Try to use a shower chair or bath bench when bathing.   Get into a tub or shower by putting in your weaker leg first. Get out with your strong side first. Have a phone or medical alert device in the bathroom with you.   Where can you learn more?  Go to https://www.[employer].net/patiented  Enter G117 in the  "search box to learn more about \"Preventing Falls: Care Instructions.\"  Current as of: July 17, 2023               Content Version: 14.0    0578-2605 [employer].   Care instructions adapted under license by your healthcare professional. If you have questions about a medical condition or this instruction, always ask your healthcare professional. [employer] disclaims any warranty or liability for your use of this information.      Learning About Stress  What is stress?     Stress is your body's response to a hard situation. Your body can have a physical, emotional, or mental response. Stress is a fact of life for most people, and it affects everyone differently. What causes stress for you may not be stressful for someone else.  A lot of things can cause stress. You may feel stress when you go on a job interview, take a test, or run a race. This kind of short-term stress is normal and even useful. It can help you if you need to work hard or react quickly. For example, stress can help you finish an important job on time.  Long-term stress is caused by ongoing stressful situations or events. Examples of long-term stress include long-term health problems, ongoing problems at work, or conflicts in your family. Long-term stress can harm your health.  How does stress affect your health?  When you are stressed, your body responds as though you are in danger. It makes hormones that speed up your heart, make you breathe faster, and give you a burst of energy. This is called the fight-or-flight stress response. If the stress is over quickly, your body goes back to normal and no harm is done.  But if stress happens too often or lasts too long, it can have bad effects. Long-term stress can make you more likely to get sick, and it can make symptoms of some diseases worse. If you tense up when you are stressed, you may develop neck, shoulder, or low back pain. Stress is linked to high blood pressure and " heart disease.  Stress also harms your emotional health. It can make you bajwa, tense, or depressed. Your relationships may suffer, and you may not do well at work or school.  What can you do to manage stress?  You can try these things to help manage stress:   Do something active. Exercise or activity can help reduce stress. Walking is a great way to get started. Even everyday activities such as housecleaning or yard work can help.  Try yoga or abi chi. These techniques combine exercise and meditation. You may need some training at first to learn them.  Do something you enjoy. For example, listen to music or go to a movie. Practice your hobby or do volunteer work.  Meditate. This can help you relax, because you are not worrying about what happened before or what may happen in the future.  Do guided imagery. Imagine yourself in any setting that helps you feel calm. You can use online videos, books, or a teacher to guide you.  Do breathing exercises. For example:  From a standing position, bend forward from the waist with your knees slightly bent. Let your arms dangle close to the floor.  Breathe in slowly and deeply as you return to a standing position. Roll up slowly and lift your head last.  Hold your breath for just a few seconds in the standing position.  Breathe out slowly and bend forward from the waist.  Let your feelings out. Talk, laugh, cry, and express anger when you need to. Talking with supportive friends or family, a counselor, or a daniel leader about your feelings is a healthy way to relieve stress. Avoid discussing your feelings with people who make you feel worse.  Write. It may help to write about things that are bothering you. This helps you find out how much stress you feel and what is causing it. When you know this, you can find better ways to cope.  What can you do to prevent stress?  You might try some of these things to help prevent stress:  Manage your time. This helps you find time to do the  "things you want and need to do.  Get enough sleep. Your body recovers from the stresses of the day while you are sleeping.  Get support. Your family, friends, and community can make a difference in how you experience stress.  Limit your news feed. Avoid or limit time on social media or news that may make you feel stressed.  Do something active. Exercise or activity can help reduce stress. Walking is a great way to get started.  Where can you learn more?  Go to https://www.[employer].net/patiented  Enter N032 in the search box to learn more about \"Learning About Stress.\"  Current as of: October 24, 2023               Content Version: 14.0    4439-3811 [employer].   Care instructions adapted under license by your healthcare professional. If you have questions about a medical condition or this instruction, always ask your healthcare professional. [employer] disclaims any warranty or liability for your use of this information.         "

## 2024-08-08 NOTE — PROGRESS NOTES
"Preventive Care Visit  [facility]  Laura Ford MD, Family Medicine  Aug 8, 2024      Assessment & Plan     Encounter for Medicare annual wellness exam  - Lipid panel reflex to direct LDL Fasting; Future  - Comprehensive metabolic panel (BMP + Alb, Alk Phos, ALT, AST, Total. Bili, TP); Future  - TSH with free T4 reflex; Future  - Comprehensive metabolic panel (BMP + Alb, Alk Phos, ALT, AST, Total. Bili, TP)  - TSH with free T4 reflex    Multiple joint pain - has been worked up in the past, will run lab as below for surveillance  - ESR: Erythrocyte sedimentation rate; Future  - ESR: Erythrocyte sedimentation rate    Primary insomnia - discussed options. She would like to try ambien for sleep. Cautioned on black box warning.   - zolpidem (AMBIEN) 5 MG tablet; Take 1 tablet (5 mg) by mouth nightly as needed for sleep    Lower abdominal pain - having alternating constpiation and diarrhea. Made following recs below:  Patient Instructions   FODMAP diet    Culturelle - lactobacillus  25-28 grams per day - try increasing 5 grams per week      BMI  Estimated body mass index is 28.51 kg/m  as calculated from the following:    Height as of this encounter: 1.702 m (5' 7\").    Weight as of this encounter: 82.6 kg (182 lb).       Counseling  Appropriate preventive services were addressed with this patient via screening, questionnaire, or discussion as appropriate for fall prevention, nutrition, physical activity, Tobacco-use cessation, weight loss and cognition.  Checklist reviewing preventive services available has been given to the patient.  Reviewed patient's diet, addressing concerns and/or questions.   She is at risk for lack of exercise and has been provided with information to increase physical activity for the benefit of her well-being.       Sherice Hinton is a 69 year old, presenting for the following:  Annual Visit (Medicare annual wellness visit)        8/8/2024     1:13 PM "   Additional Questions   Roomed by Rabia Linda         Health Care Directive  Patient does not have a Health Care Directive or Living Will: Discussed advance care planning with patient; information given to patient to review.    HPI        8/7/2024   General Health   How would you rate your overall physical health? (!) FAIR   Feel stress (tense, anxious, or unable to sleep) Rather much      (!) STRESS CONCERN      8/7/2024   Nutrition   Diet: Other   If other, please elaborate: I have been doing intermittent fasting (16:8) since April 2024 8/7/2024   Exercise   Days per week of moderate/strenous exercise 1 day   Average minutes spent exercising at this level 50 min      (!) EXERCISE CONCERN      8/7/2024   Social Factors   Frequency of gathering with friends or relatives Once a week   Worry food won't last until get money to buy more No   Food not last or not have enough money for food? No   Do you have housing? (Housing is defined as stable permanent housing and does not include staying ouside in a car, in a tent, in an abandoned building, in an overnight shelter, or couch-surfing.) Yes   Are you worried about losing your housing? No   Lack of transportation? No   Unable to get utilities (heat,electricity)? No            8/8/2024   Fall Risk   Gait Speed Test (Document in seconds) 4.43   Gait Speed Test Interpretation Less than or equal to 5.00 seconds - PASS             8/7/2024   Activities of Daily Living- Home Safety   Needs help with the following daily activites None of the above   Safety concerns in the home None of the above            8/7/2024   Dental   Dentist two times every year? Yes            8/7/2024   Hearing Screening   Hearing concerns? None of the above            8/7/2024   Driving Risk Screening   Patient/family members have concerns about driving No            8/7/2024   General Alertness/Fatigue Screening   Have you been more tired than usual lately? (!) DECLINE             8/7/2024   Urinary Incontinence Screening   Bothered by leaking urine in past 6 months No            4/2/2024   TB Screening   Were you born outside of the [place]? No            Today's PHQ-2 Score:       8/7/2024     9:34 AM   PHQ-2 ( 1999 [employer])   Q1: Little interest or pleasure in doing things 1   Q2: Feeling down, depressed or hopeless 1   PHQ-2 Score 2   Q1: Little interest or pleasure in doing things Several days   Q2: Feeling down, depressed or hopeless Several days   PHQ-2 Score 2           8/7/2024   Substance Use   Alcohol more than 3/day or more than 7/wk No   Do you have a current opioid prescription? No   How severe/bad is pain from 1 to 10? 3/10   Do you use any other substances recreationally? No        Social History     Tobacco Use    Smoking status: Never     Passive exposure: Never    Smokeless tobacco: Never   Vaping Use    Vaping status: Never Used   Substance Use Topics    Alcohol use: Yes     Comment: socially (3 drinks per week)    Drug use: No           9/23/2022   LAST FHS-7 RESULTS   1st degree relative breast or ovarian cancer No   Any relative bilateral breast cancer No   Any male have breast cancer No   Any ONE woman have BOTH breast AND ovarian cancer No   Any woman with breast cancer before 50yrs No   2 or more relatives with breast AND/OR ovarian cancer No   2 or more relatives with breast AND/OR bowel cancer No           Mammogram Screening - Mammogram every 1-2 years updated in Health Maintenance based on mutual decision making      History of abnormal Pap smear: No - age 30- 64 PAP with HPV every 5 years recommended        Latest Ref Rng & Units 2/20/2017     3:01 PM 2/20/2017     2:45 PM 10/7/2015    11:53 AM   PAP / HPV   PAP (Historical)  NIL      HPV 16 DNA NEG  Negative  Negative    HPV 18 DNA NEG  Negative  Negative    Other HR HPV NEG  Negative  Negative      ASCVD Risk   The 10-year ASCVD risk score (Pina MARTIN, et al., 2019) is: 7.4%    Values used to calculate the  score:      Age: 69 years      Sex: Female      Is Non- : No      Diabetic: No      Tobacco smoker: No      Systolic Blood Pressure: 131 mmHg      Is BP treated: No      HDL Cholesterol: 59 mg/dL      Total Cholesterol: 172 mg/dL          Reviewed and updated as needed this visit by Provider                    Patient Active Problem List   Diagnosis    Herpes labialis    Osteopenia    GERD (gastroesophageal reflux disease)    History of endometrial cancer    Lower abdominal pain     Past Surgical History:   Procedure Laterality Date    CHOLECYSTECTOMY  2015    HC TOOTH EXTRACTION W/FORCEP      SURGICAL HISTORY OF -       3      TONSILLECTOMY      ZZC TOTAL ABDOM HYSTERECTOMY      Dr Len BARDALES BSO staging for Stage 1 B endometrial Ca       Social History     Tobacco Use    Smoking status: Never     Passive exposure: Never    Smokeless tobacco: Never   Substance Use Topics    Alcohol use: Yes     Comment: socially (3 drinks per week)     Family History   Problem Relation Age of Onset    Circulatory Father          at an early age of annurysm-brain- at 45    Alcohol/Drug Father     Substance Abuse Father             Heart Disease Maternal Grandmother         UNKNWON DETAILS    Hypertension Maternal Grandmother     Cerebrovascular Disease Maternal Grandmother     Diabetes Paternal Grandmother     Hypertension Paternal Grandmother     Cerebrovascular Disease Paternal Grandmother     Family History Negative Mother         alive 79 yo old    Osteoporosis Mother     Family History Negative Maternal Grandfather          in accident    Family History Negative Paternal Grandfather     Family History Negative Brother         alive 58 , smoker    Alcohol/Drug Brother         one bother only     Heart Disease Brother     Substance Abuse Brother     Breast Cancer No family hx of     Cancer - colorectal No family hx of          Current providers sharing in care for this patient  "include:  Patient Care Team:  Laura Ford MD as PCP - General (Family Practice)  Laura Ford MD as Assigned PCP  José Martinez MD as Assigned Musculoskeletal Provider    The following health maintenance items are reviewed in Epic and correct as of today:  Health Maintenance   Topic Date Due    MIGRAINE ACTION PLAN  Never done    RSV VACCINE (Pregnancy & 60+) (1 - 1-dose 60+ series) Never done    Pneumococcal Vaccine: 65+ Years (1 of 1 - PCV) Never done    COVID-19 Vaccine (4 - 2023-24 season) 09/01/2023    MEDICARE ANNUAL WELLNESS VISIT  03/30/2024    ANNUAL REVIEW OF HM ORDERS  03/30/2024    INFLUENZA VACCINE (1) 09/01/2024    MAMMO SCREENING  09/23/2024    FALL RISK ASSESSMENT  08/08/2025    GLUCOSE  03/30/2026    LIPID  06/07/2026    DEXA  06/05/2027    COLORECTAL CANCER SCREENING  07/23/2027    ADVANCE CARE PLANNING  03/30/2028    DTAP/TDAP/TD IMMUNIZATION (3 - Td or Tdap) 07/19/2028    HEPATITIS C SCREENING  Completed    PHQ-2 (once per calendar year)  Completed    ZOSTER IMMUNIZATION  Completed    IPV IMMUNIZATION  Aged Out    HPV IMMUNIZATION  Aged Out    MENINGITIS IMMUNIZATION  Aged Out    RSV MONOCLONAL ANTIBODY  Aged Out    PAP  Discontinued         Review of Systems  Constitutional, neuro, ENT, endocrine, pulmonary, cardiac, gastrointestinal, genitourinary, musculoskeletal, integument and psychiatric systems are negative, except as otherwise noted.     Objective    Exam  /89 (BP Location: Right arm, Patient Position: Chair, Cuff Size: Adult Large)   Pulse 95   Temp 97.8  F (36.6  C) (Oral)   Resp 16   Ht 1.702 m (5' 7\")   Wt 82.6 kg (182 lb)   LMP 08/07/2008   SpO2 96%   BMI 28.51 kg/m     Estimated body mass index is 28.51 kg/m  as calculated from the following:    Height as of this encounter: 1.702 m (5' 7\").    Weight as of this encounter: 82.6 kg (182 lb).    Physical Exam  GENERAL: alert and no distress  EYES: Eyes grossly normal to inspection, PERRL and " conjunctivae and sclerae normal  HENT: ear canals and TM's normal, nose and mouth without ulcers or lesions  NECK: no adenopathy, no asymmetry, masses, or scars  RESP: lungs clear to auscultation - no rales, rhonchi or wheezes  BREAST: normal without masses, tenderness or nipple discharge and no palpable axillary masses or adenopathy  CV: regular rate and rhythm, normal S1 S2, no S3 or S4, no murmur, click or rub, no peripheral edema  ABDOMEN: soft, nontender, no hepatosplenomegaly, no masses and bowel sounds normal  MS: no gross musculoskeletal defects noted, no edema  SKIN: no suspicious lesions or rashes  NEURO: Normal strength and tone, mentation intact and speech normal  PSYCH: mentation appears normal, affect normal/bright         8/8/2024   Mini Cog   Clock Draw Score 2 Normal   3 Item Recall 3 objects recalled   Mini Cog Total Score 5                 Signed Electronically by: Laura Ford MD

## 2024-08-09 LAB
CHOLEST SERPL-MCNC: 192 MG/DL
HDLC SERPL-MCNC: 53 MG/DL
LDLC SERPL CALC-MCNC: 113 MG/DL
NONHDLC SERPL-MCNC: 139 MG/DL
TRIGL SERPL-MCNC: 128 MG/DL

## 2024-08-10 ENCOUNTER — MYC MEDICAL ADVICE (OUTPATIENT)
Dept: FAMILY MEDICINE | Facility: CLINIC | Age: 69
End: 2024-08-10
Payer: COMMERCIAL

## 2024-08-26 ENCOUNTER — PATIENT OUTREACH (OUTPATIENT)
Dept: CARE COORDINATION | Facility: CLINIC | Age: 69
End: 2024-08-26
Payer: COMMERCIAL

## 2024-10-14 ENCOUNTER — E-VISIT (OUTPATIENT)
Dept: FAMILY MEDICINE | Facility: CLINIC | Age: 69
End: 2024-10-14
Payer: COMMERCIAL

## 2024-10-14 DIAGNOSIS — B35.1 ONYCHOMYCOSIS: Primary | ICD-10-CM

## 2024-10-14 PROCEDURE — 99421 OL DIG E/M SVC 5-10 MIN: CPT | Performed by: FAMILY MEDICINE

## 2024-10-14 RX ORDER — TERBINAFINE HYDROCHLORIDE 250 MG/1
250 TABLET ORAL DAILY
Qty: 90 TABLET | Refills: 0 | Status: SHIPPED | OUTPATIENT
Start: 2024-10-14 | End: 2025-01-12

## 2024-10-23 ENCOUNTER — MYC REFILL (OUTPATIENT)
Dept: FAMILY MEDICINE | Facility: CLINIC | Age: 69
End: 2024-10-23
Payer: COMMERCIAL

## 2024-10-23 DIAGNOSIS — F51.01 PRIMARY INSOMNIA: ICD-10-CM

## 2024-10-24 RX ORDER — ZOLPIDEM TARTRATE 5 MG/1
5 TABLET ORAL
Qty: 14 TABLET | Refills: 0 | Status: SHIPPED | OUTPATIENT
Start: 2024-10-24

## 2024-10-28 ENCOUNTER — HOSPITAL ENCOUNTER (OUTPATIENT)
Dept: MAMMOGRAPHY | Facility: CLINIC | Age: 69
Discharge: HOME | End: 2024-10-28
Attending: FAMILY MEDICINE | Admitting: FAMILY MEDICINE
Payer: COMMERCIAL

## 2024-10-28 DIAGNOSIS — Z12.31 ENCOUNTER FOR SCREENING MAMMOGRAM FOR BREAST CANCER: ICD-10-CM

## 2024-10-28 PROCEDURE — 77063 BREAST TOMOSYNTHESIS BI: CPT

## 2025-02-10 ENCOUNTER — MYC MEDICAL ADVICE (OUTPATIENT)
Dept: FAMILY MEDICINE | Facility: CLINIC | Age: 70
End: 2025-02-10
Payer: COMMERCIAL

## 2025-02-10 ENCOUNTER — MYC REFILL (OUTPATIENT)
Dept: FAMILY MEDICINE | Facility: CLINIC | Age: 70
End: 2025-02-10
Payer: COMMERCIAL

## 2025-02-10 DIAGNOSIS — K21.00 GASTROESOPHAGEAL REFLUX DISEASE WITH ESOPHAGITIS, UNSPECIFIED WHETHER HEMORRHAGE: ICD-10-CM

## 2025-02-10 DIAGNOSIS — F51.01 PRIMARY INSOMNIA: ICD-10-CM

## 2025-02-10 RX ORDER — OMEPRAZOLE 40 MG/1
40 CAPSULE, DELAYED RELEASE ORAL DAILY
Qty: 90 CAPSULE | Refills: 1 | Status: SHIPPED | OUTPATIENT
Start: 2025-02-10

## 2025-02-10 RX ORDER — ZOLPIDEM TARTRATE 5 MG/1
5 TABLET ORAL
Qty: 14 TABLET | Refills: 0 | Status: SHIPPED | OUTPATIENT
Start: 2025-02-10

## 2025-02-10 NOTE — TELEPHONE ENCOUNTER
See my chart reply ok for RF?    I took it originally for many weeks/couple months in  after our son . I started feeling better about a year ago ( I had shared that with  last August) but my Mom went on Hospice this.past December and  last week. I had been taking the few I left during that time. I was wondering if I could get a refill as my stomach has.been bad again. tx

## 2025-02-10 NOTE — TELEPHONE ENCOUNTER
Clinic RN: Please investigate patient's chart or contact patient if the information cannot be found because patient should have run out of this medication on 9/30/23. Confirm patient is taking this medication as prescribed. Document findings and route refill encounter to provider for approval or denial.    Joss Saldana, RN, BSN, MSN  RN Lead

## 2025-08-14 SDOH — HEALTH STABILITY: PHYSICAL HEALTH: ON AVERAGE, HOW MANY DAYS PER WEEK DO YOU ENGAGE IN MODERATE TO STRENUOUS EXERCISE (LIKE A BRISK WALK)?: 1 DAY

## 2025-08-14 SDOH — HEALTH STABILITY: PHYSICAL HEALTH: ON AVERAGE, HOW MANY MINUTES DO YOU ENGAGE IN EXERCISE AT THIS LEVEL?: 20 MIN

## 2025-08-14 ASSESSMENT — SOCIAL DETERMINANTS OF HEALTH (SDOH): HOW OFTEN DO YOU GET TOGETHER WITH FRIENDS OR RELATIVES?: MORE THAN THREE TIMES A WEEK

## 2025-08-18 ENCOUNTER — OFFICE VISIT (OUTPATIENT)
Dept: FAMILY MEDICINE | Facility: CLINIC | Age: 70
End: 2025-08-18
Attending: FAMILY MEDICINE
Payer: COMMERCIAL

## 2025-08-18 VITALS
DIASTOLIC BLOOD PRESSURE: 91 MMHG | RESPIRATION RATE: 12 BRPM | TEMPERATURE: 97.8 F | WEIGHT: 190.4 LBS | SYSTOLIC BLOOD PRESSURE: 133 MMHG | HEART RATE: 86 BPM | HEIGHT: 67 IN | OXYGEN SATURATION: 98 % | BODY MASS INDEX: 29.88 KG/M2

## 2025-08-18 DIAGNOSIS — G47.00 PERSISTENT DISORDER OF INITIATING OR MAINTAINING SLEEP: ICD-10-CM

## 2025-08-18 DIAGNOSIS — F41.9 ANXIETY DISORDER OF CHILDHOOD OR ADOLESCENCE: ICD-10-CM

## 2025-08-18 DIAGNOSIS — Z00.00 ENCOUNTER FOR MEDICARE ANNUAL WELLNESS EXAM: Primary | ICD-10-CM

## 2025-08-18 DIAGNOSIS — R53.83 OTHER FATIGUE: ICD-10-CM

## 2025-08-18 DIAGNOSIS — F43.21 GRIEF: ICD-10-CM

## 2025-08-18 DIAGNOSIS — R45.86 BRIEF COMPENSATORY MOOD SWINGS: ICD-10-CM

## 2025-08-18 DIAGNOSIS — K21.00 GASTROESOPHAGEAL REFLUX DISEASE WITH ESOPHAGITIS, UNSPECIFIED WHETHER HEMORRHAGE: ICD-10-CM

## 2025-08-18 LAB
BASOPHILS # BLD AUTO: <0.04 10E3/UL (ref 0–0.2)
BASOPHILS NFR BLD AUTO: 0.3 %
EOSINOPHIL # BLD AUTO: 0.25 10E3/UL (ref 0–0.7)
EOSINOPHIL NFR BLD AUTO: 2.6 %
ERYTHROCYTE [DISTWIDTH] IN BLOOD BY AUTOMATED COUNT: 12.6 % (ref 10–15)
EST. AVERAGE GLUCOSE BLD GHB EST-MCNC: 105 MG/DL
HBA1C MFR BLD: 5.3 % (ref 0–5.6)
HCT VFR BLD AUTO: 42.3 % (ref 35–47)
HGB BLD-MCNC: 14.1 G/DL (ref 11.7–15.7)
IMM GRANULOCYTES # BLD: <0.04 10E3/UL
IMM GRANULOCYTES NFR BLD: 0.2 %
LYMPHOCYTES # BLD AUTO: 3.21 10E3/UL (ref 0.8–5.3)
LYMPHOCYTES NFR BLD AUTO: 33.4 %
MCH RBC QN AUTO: 31 PG (ref 26.5–33)
MCHC RBC AUTO-ENTMCNC: 33.3 G/DL (ref 31.5–36.5)
MCV RBC AUTO: 93 FL (ref 78–100)
MONOCYTES # BLD AUTO: 0.91 10E3/UL (ref 0–1.3)
MONOCYTES NFR BLD AUTO: 9.5 %
NEUTROPHILS # BLD AUTO: 5.18 10E3/UL (ref 1.6–8.3)
NEUTROPHILS NFR BLD AUTO: 54 %
PLATELET # BLD AUTO: 320 10E3/UL (ref 150–450)
RBC # BLD AUTO: 4.55 10E6/UL (ref 3.8–5.2)
WBC # BLD AUTO: 9.6 10E3/UL (ref 4–11)

## 2025-08-18 PROCEDURE — 99000 SPECIMEN HANDLING OFFICE-LAB: CPT | Performed by: FAMILY MEDICINE

## 2025-08-18 PROCEDURE — 86376 MICROSOMAL ANTIBODY EACH: CPT | Performed by: FAMILY MEDICINE

## 2025-08-18 PROCEDURE — 86141 C-REACTIVE PROTEIN HS: CPT | Performed by: FAMILY MEDICINE

## 2025-08-18 PROCEDURE — 83090 ASSAY OF HOMOCYSTEINE: CPT | Performed by: FAMILY MEDICINE

## 2025-08-18 PROCEDURE — 86800 THYROGLOBULIN ANTIBODY: CPT | Performed by: FAMILY MEDICINE

## 2025-08-18 PROCEDURE — 83525 ASSAY OF INSULIN: CPT | Performed by: FAMILY MEDICINE

## 2025-08-18 PROCEDURE — 36415 COLL VENOUS BLD VENIPUNCTURE: CPT | Performed by: FAMILY MEDICINE

## 2025-08-18 PROCEDURE — 84482 T3 REVERSE: CPT | Mod: 90 | Performed by: FAMILY MEDICINE

## 2025-08-18 PROCEDURE — 84432 ASSAY OF THYROGLOBULIN: CPT | Performed by: FAMILY MEDICINE

## 2025-08-18 RX ORDER — OMEPRAZOLE 40 MG/1
40 CAPSULE, DELAYED RELEASE ORAL PRN
Qty: 90 CAPSULE | Refills: 3 | Status: SHIPPED | OUTPATIENT
Start: 2025-08-18

## 2025-08-18 RX ORDER — LANOLIN ALCOHOL/MO/W.PET/CERES
CREAM (GRAM) TOPICAL
COMMUNITY
Start: 2025-05-16

## 2025-08-18 ASSESSMENT — PATIENT HEALTH QUESTIONNAIRE - PHQ9
SUM OF ALL RESPONSES TO PHQ QUESTIONS 1-9: 16
SUM OF ALL RESPONSES TO PHQ QUESTIONS 1-9: 16
10. IF YOU CHECKED OFF ANY PROBLEMS, HOW DIFFICULT HAVE THESE PROBLEMS MADE IT FOR YOU TO DO YOUR WORK, TAKE CARE OF THINGS AT HOME, OR GET ALONG WITH OTHER PEOPLE: SOMEWHAT DIFFICULT

## 2025-08-18 ASSESSMENT — PAIN SCALES - GENERAL: PAINLEVEL_OUTOF10: NO PAIN (0)

## 2025-08-19 LAB
ALBUMIN SERPL BCG-MCNC: 4.3 G/DL (ref 3.5–5.2)
ALP SERPL-CCNC: 103 U/L (ref 40–150)
ALT SERPL W P-5'-P-CCNC: 16 U/L (ref 0–50)
ANION GAP SERPL CALCULATED.3IONS-SCNC: 12 MMOL/L (ref 7–15)
AST SERPL W P-5'-P-CCNC: 20 U/L (ref 0–45)
BILIRUB SERPL-MCNC: 0.3 MG/DL
BUN SERPL-MCNC: 16.7 MG/DL (ref 8–23)
CALCIUM SERPL-MCNC: 9.5 MG/DL (ref 8.8–10.4)
CHLORIDE SERPL-SCNC: 104 MMOL/L (ref 98–107)
CHOLEST SERPL-MCNC: 207 MG/DL
CREAT SERPL-MCNC: 0.87 MG/DL (ref 0.51–0.95)
CRP SERPL HS-MCNC: 2.97 MG/L
EGFRCR SERPLBLD CKD-EPI 2021: 72 ML/MIN/1.73M2
FASTING STATUS PATIENT QL REPORTED: NO
FASTING STATUS PATIENT QL REPORTED: NO
FERRITIN SERPL-MCNC: 203 NG/ML (ref 11–328)
GLUCOSE SERPL-MCNC: 83 MG/DL (ref 70–99)
HCO3 SERPL-SCNC: 23 MMOL/L (ref 22–29)
HCYS SERPL-SCNC: 20.2 UMOL/L (ref 0–15)
HDLC SERPL-MCNC: 56 MG/DL
INSULIN SERPL-ACNC: 18.2 UU/ML (ref 2.6–24.9)
LDLC SERPL CALC-MCNC: 122 MG/DL
NONHDLC SERPL-MCNC: 151 MG/DL
POTASSIUM SERPL-SCNC: 4.9 MMOL/L (ref 3.4–5.3)
PROT SERPL-MCNC: 7.1 G/DL (ref 6.4–8.3)
SODIUM SERPL-SCNC: 139 MMOL/L (ref 135–145)
T3FREE SERPL-MCNC: 3 PG/ML (ref 2–4.4)
T4 FREE SERPL-MCNC: 1.04 NG/DL (ref 0.9–1.7)
THYROGLOB AB SERPL IA-ACNC: <20 IU/ML
THYROGLOB SERPL-MCNC: 9.1 NG/ML
THYROPEROXIDASE AB SERPL-ACNC: <10 IU/ML
TRIGL SERPL-MCNC: 144 MG/DL
TSH SERPL DL<=0.005 MIU/L-ACNC: 2.96 UIU/ML (ref 0.3–4.2)
VIT D+METAB SERPL-MCNC: 34 NG/ML (ref 20–50)

## 2025-08-26 LAB — T3REVERSE SERPL-MCNC: 15.2 NG/DL
